# Patient Record
Sex: FEMALE | Race: WHITE | NOT HISPANIC OR LATINO | Employment: OTHER | URBAN - METROPOLITAN AREA
[De-identification: names, ages, dates, MRNs, and addresses within clinical notes are randomized per-mention and may not be internally consistent; named-entity substitution may affect disease eponyms.]

---

## 2023-06-14 ENCOUNTER — OFFICE VISIT (OUTPATIENT)
Dept: PRIMARY CARE | Facility: CLINIC | Age: 68
End: 2023-06-14
Payer: MEDICARE

## 2023-06-14 VITALS
DIASTOLIC BLOOD PRESSURE: 77 MMHG | BODY MASS INDEX: 29.52 KG/M2 | HEART RATE: 80 BPM | TEMPERATURE: 97.6 F | OXYGEN SATURATION: 98 % | WEIGHT: 172 LBS | SYSTOLIC BLOOD PRESSURE: 130 MMHG

## 2023-06-14 DIAGNOSIS — M25.552 BILATERAL HIP PAIN: Primary | ICD-10-CM

## 2023-06-14 DIAGNOSIS — R73.01 ELEVATED FASTING GLUCOSE: ICD-10-CM

## 2023-06-14 DIAGNOSIS — M25.551 BILATERAL HIP PAIN: Primary | ICD-10-CM

## 2023-06-14 DIAGNOSIS — R63.5 ABNORMAL WEIGHT GAIN: ICD-10-CM

## 2023-06-14 DIAGNOSIS — E04.1 THYROID NODULE: ICD-10-CM

## 2023-06-14 DIAGNOSIS — I10 ESSENTIAL HYPERTENSION, BENIGN: ICD-10-CM

## 2023-06-14 DIAGNOSIS — Z12.31 ENCOUNTER FOR SCREENING MAMMOGRAM FOR MALIGNANT NEOPLASM OF BREAST: ICD-10-CM

## 2023-06-14 DIAGNOSIS — E55.9 VITAMIN D INSUFFICIENCY: ICD-10-CM

## 2023-06-14 PROBLEM — E83.52: Status: ACTIVE | Noted: 2023-06-14

## 2023-06-14 PROBLEM — M47.812 CERVICAL SPONDYLOSIS WITHOUT MYELOPATHY: Status: ACTIVE | Noted: 2023-06-14

## 2023-06-14 PROBLEM — I51.9 DIASTOLIC DYSFUNCTION, LEFT VENTRICLE: Status: ACTIVE | Noted: 2023-06-14

## 2023-06-14 PROBLEM — R91.1 LUNG NODULE: Status: ACTIVE | Noted: 2023-06-14

## 2023-06-14 PROBLEM — R59.0 AXILLARY ADENOPATHY: Status: ACTIVE | Noted: 2023-06-14

## 2023-06-14 PROBLEM — N64.59 INVERTED NIPPLE: Status: ACTIVE | Noted: 2023-06-14

## 2023-06-14 PROBLEM — M85.9 DISORDER OF BONE DENSITY AND STRUCTURE, UNSPECIFIED: Status: ACTIVE | Noted: 2023-06-14

## 2023-06-14 PROCEDURE — 1036F TOBACCO NON-USER: CPT | Performed by: NURSE PRACTITIONER

## 2023-06-14 PROCEDURE — 99213 OFFICE O/P EST LOW 20 MIN: CPT | Performed by: NURSE PRACTITIONER

## 2023-06-14 PROCEDURE — 1159F MED LIST DOCD IN RCRD: CPT | Performed by: NURSE PRACTITIONER

## 2023-06-14 PROCEDURE — 3008F BODY MASS INDEX DOCD: CPT | Performed by: NURSE PRACTITIONER

## 2023-06-14 PROCEDURE — 3075F SYST BP GE 130 - 139MM HG: CPT | Performed by: NURSE PRACTITIONER

## 2023-06-14 PROCEDURE — 3078F DIAST BP <80 MM HG: CPT | Performed by: NURSE PRACTITIONER

## 2023-06-14 PROCEDURE — 1160F RVW MEDS BY RX/DR IN RCRD: CPT | Performed by: NURSE PRACTITIONER

## 2023-06-14 RX ORDER — LISINOPRIL AND HYDROCHLOROTHIAZIDE 20; 25 MG/1; MG/1
1 TABLET ORAL DAILY
COMMUNITY
End: 2023-10-26 | Stop reason: ALTCHOICE

## 2023-06-14 RX ORDER — AMLODIPINE BESYLATE 10 MG/1
10 TABLET ORAL DAILY
COMMUNITY
End: 2023-10-26 | Stop reason: SDUPTHER

## 2023-06-14 ASSESSMENT — PATIENT HEALTH QUESTIONNAIRE - PHQ9
SUM OF ALL RESPONSES TO PHQ9 QUESTIONS 1 AND 2: 0
2. FEELING DOWN, DEPRESSED OR HOPELESS: NOT AT ALL
1. LITTLE INTEREST OR PLEASURE IN DOING THINGS: NOT AT ALL

## 2023-06-14 NOTE — PROGRESS NOTES
Subjective   Patient ID: Ambreen Garcia is a 67 y.o. female who presents for Hip Pain (Bilateral hip pain, right hip is the worst/No injury that she is aware of).    Been 1-2 mo  worsening  Hip pain  R > L  After exercise worse  Right is more constant pain  Walks dogs  Golf  Never had this hip pain before  No hx XR  Tylenol for arthritis  - helps  No falls or trauma  No swelling or redness  No cracks or pops  No slippage  No numbness or tingling or burning        Review of Systems  ROS completely negative except what was mentioned in the HPI.  Problem List, surgical, social, and family histories which were reviewed and updated as necessary within the EMR. I also personally reviewed the notes, labs, and imaging that pertained to what was documented or discussed in the HPI.      Objective   Physical Exam  Vitals and nursing note reviewed.   Constitutional:       Appearance: Normal appearance.   HENT:      Head: Normocephalic and atraumatic.      Right Ear: External ear normal.      Left Ear: External ear normal.      Nose: Nose normal.      Mouth/Throat:      Mouth: Mucous membranes are moist.   Eyes:      Extraocular Movements: Extraocular movements intact.      Conjunctiva/sclera: Conjunctivae normal.      Pupils: Pupils are equal, round, and reactive to light.   Pulmonary:      Effort: Pulmonary effort is normal.   Musculoskeletal:         General: No swelling or tenderness. Normal range of motion.      Cervical back: Normal range of motion and neck supple.   Neurological:      General: No focal deficit present.      Mental Status: She is alert and oriented to person, place, and time. Mental status is at baseline.   Psychiatric:         Mood and Affect: Mood normal.         Behavior: Behavior normal.         Thought Content: Thought content normal.         Judgment: Judgment normal.         /77   Pulse 80   Temp 36.4 °C (97.6 °F) (Temporal)   Wt 78 kg (172 lb)   SpO2 98%   BMI 29.52 kg/m²      Assessment/Plan   Problem List Items Addressed This Visit       Bilateral hip pain - Primary     Desires Xrs  Declines medication or PT at this time         Relevant Orders    XR hips bilateral 5+ VW w or wo pelvis (Completed)    BMI 29.0-29.9,adult    Relevant Orders    Urinalysis with Reflex Microscopic    Lipid Panel    Vitamin D, Total    Comprehensive Metabolic Panel    CBC and Auto Differential    Hemoglobin A1C    Essential hypertension, benign    Relevant Orders    Urinalysis with Reflex Microscopic    Lipid Panel    Vitamin D, Total    Comprehensive Metabolic Panel    CBC and Auto Differential    Hemoglobin A1C    Thyroid nodule    Relevant Orders    Urinalysis with Reflex Microscopic    Lipid Panel    Vitamin D, Total    Comprehensive Metabolic Panel    CBC and Auto Differential    Hemoglobin A1C    Vitamin D insufficiency    Relevant Orders    Urinalysis with Reflex Microscopic    Lipid Panel    Vitamin D, Total    TSH with reflex to Free T4 if abnormal    Comprehensive Metabolic Panel    CBC and Auto Differential    Hemoglobin A1C     Other Visit Diagnoses       Abnormal weight gain        Relevant Orders    Urinalysis with Reflex Microscopic    Lipid Panel    Vitamin D, Total    TSH with reflex to Free T4 if abnormal    Comprehensive Metabolic Panel    CBC and Auto Differential    Hemoglobin A1C    Elevated fasting glucose        Relevant Orders    Urinalysis with Reflex Microscopic    Lipid Panel    Vitamin D, Total    TSH with reflex to Free T4 if abnormal    Comprehensive Metabolic Panel    CBC and Auto Differential    Hemoglobin A1C    Encounter for screening mammogram for malignant neoplasm of breast        Relevant Orders    BI mammo bilateral screening tomosynthesis

## 2023-06-15 PROBLEM — M76.891 ENTHESOPATHY OF RIGHT HIP REGION: Status: ACTIVE | Noted: 2023-06-15

## 2023-06-15 PROBLEM — Z00.00 ROUTINE HEALTH MAINTENANCE: Status: ACTIVE | Noted: 2023-06-15

## 2023-06-15 PROBLEM — K52.9 COLITIS: Status: ACTIVE | Noted: 2023-06-15

## 2023-06-15 PROBLEM — Z71.89 CARDIAC RISK COUNSELING: Status: ACTIVE | Noted: 2023-06-15

## 2023-06-15 PROBLEM — Z71.89 ADVANCE DIRECTIVE DISCUSSED WITH PATIENT: Status: ACTIVE | Noted: 2023-06-15

## 2023-09-26 ENCOUNTER — LAB (OUTPATIENT)
Dept: LAB | Facility: LAB | Age: 68
End: 2023-09-26
Payer: MEDICARE

## 2023-09-26 DIAGNOSIS — R63.5 ABNORMAL WEIGHT GAIN: ICD-10-CM

## 2023-09-26 DIAGNOSIS — R73.01 ELEVATED FASTING GLUCOSE: ICD-10-CM

## 2023-09-26 DIAGNOSIS — E04.1 THYROID NODULE: ICD-10-CM

## 2023-09-26 DIAGNOSIS — E55.9 VITAMIN D INSUFFICIENCY: ICD-10-CM

## 2023-09-26 DIAGNOSIS — I10 ESSENTIAL HYPERTENSION, BENIGN: ICD-10-CM

## 2023-09-26 LAB
ALANINE AMINOTRANSFERASE (SGPT) (U/L) IN SER/PLAS: 16 U/L (ref 7–45)
ALBUMIN (G/DL) IN SER/PLAS: 4.1 G/DL (ref 3.4–5)
ALKALINE PHOSPHATASE (U/L) IN SER/PLAS: 61 U/L (ref 33–136)
ANION GAP IN SER/PLAS: 10 MMOL/L (ref 10–20)
APPEARANCE, URINE: CLEAR
ASPARTATE AMINOTRANSFERASE (SGOT) (U/L) IN SER/PLAS: 16 U/L (ref 9–39)
BASOPHILS (10*3/UL) IN BLOOD BY AUTOMATED COUNT: 0.06 X10E9/L (ref 0–0.1)
BASOPHILS/100 LEUKOCYTES IN BLOOD BY AUTOMATED COUNT: 0.9 % (ref 0–2)
BILIRUBIN TOTAL (MG/DL) IN SER/PLAS: 0.5 MG/DL (ref 0–1.2)
BILIRUBIN, URINE: NEGATIVE
BLOOD, URINE: NEGATIVE
CALCIDIOL (25 OH VITAMIN D3) (NG/ML) IN SER/PLAS: 43 NG/ML
CALCIUM (MG/DL) IN SER/PLAS: 9.7 MG/DL (ref 8.6–10.3)
CARBON DIOXIDE, TOTAL (MMOL/L) IN SER/PLAS: 29 MMOL/L (ref 21–32)
CHLORIDE (MMOL/L) IN SER/PLAS: 105 MMOL/L (ref 98–107)
CHOLESTEROL (MG/DL) IN SER/PLAS: 211 MG/DL (ref 0–199)
CHOLESTEROL IN HDL (MG/DL) IN SER/PLAS: 76.9 MG/DL
CHOLESTEROL/HDL RATIO: 2.7
COLOR, URINE: ABNORMAL
CREATININE (MG/DL) IN SER/PLAS: 0.56 MG/DL (ref 0.5–1.05)
EOSINOPHILS (10*3/UL) IN BLOOD BY AUTOMATED COUNT: 0.16 X10E9/L (ref 0–0.7)
EOSINOPHILS/100 LEUKOCYTES IN BLOOD BY AUTOMATED COUNT: 2.4 % (ref 0–6)
ERYTHROCYTE DISTRIBUTION WIDTH (RATIO) BY AUTOMATED COUNT: 12.7 % (ref 11.5–14.5)
ERYTHROCYTE MEAN CORPUSCULAR HEMOGLOBIN CONCENTRATION (G/DL) BY AUTOMATED: 33.7 G/DL (ref 32–36)
ERYTHROCYTE MEAN CORPUSCULAR VOLUME (FL) BY AUTOMATED COUNT: 92 FL (ref 80–100)
ERYTHROCYTES (10*6/UL) IN BLOOD BY AUTOMATED COUNT: 4.19 X10E12/L (ref 4–5.2)
ESTIMATED AVERAGE GLUCOSE FOR HBA1C: 105 MG/DL
GFR FEMALE: >90 ML/MIN/1.73M2
GLUCOSE (MG/DL) IN SER/PLAS: 90 MG/DL (ref 74–99)
GLUCOSE, URINE: NEGATIVE MG/DL
HEMATOCRIT (%) IN BLOOD BY AUTOMATED COUNT: 38.6 % (ref 36–46)
HEMOGLOBIN (G/DL) IN BLOOD: 13 G/DL (ref 12–16)
HEMOGLOBIN A1C/HEMOGLOBIN TOTAL IN BLOOD: 5.3 %
IMMATURE GRANULOCYTES/100 LEUKOCYTES IN BLOOD BY AUTOMATED COUNT: 0.3 % (ref 0–0.9)
KETONES, URINE: NEGATIVE MG/DL
LDL: 115 MG/DL (ref 0–99)
LEUKOCYTE ESTERASE, URINE: ABNORMAL
LEUKOCYTES (10*3/UL) IN BLOOD BY AUTOMATED COUNT: 6.6 X10E9/L (ref 4.4–11.3)
LYMPHOCYTES (10*3/UL) IN BLOOD BY AUTOMATED COUNT: 2.58 X10E9/L (ref 1.2–4.8)
LYMPHOCYTES/100 LEUKOCYTES IN BLOOD BY AUTOMATED COUNT: 39.4 % (ref 13–44)
MONOCYTES (10*3/UL) IN BLOOD BY AUTOMATED COUNT: 0.72 X10E9/L (ref 0.1–1)
MONOCYTES/100 LEUKOCYTES IN BLOOD BY AUTOMATED COUNT: 11 % (ref 2–10)
MUCUS, URINE: NORMAL /LPF
NEUTROPHILS (10*3/UL) IN BLOOD BY AUTOMATED COUNT: 3.01 X10E9/L (ref 1.2–7.7)
NEUTROPHILS/100 LEUKOCYTES IN BLOOD BY AUTOMATED COUNT: 46 % (ref 40–80)
NITRITE, URINE: NEGATIVE
PH, URINE: 7 (ref 5–8)
PLATELETS (10*3/UL) IN BLOOD AUTOMATED COUNT: 320 X10E9/L (ref 150–450)
POTASSIUM (MMOL/L) IN SER/PLAS: 4.1 MMOL/L (ref 3.5–5.3)
PROTEIN TOTAL: 6.8 G/DL (ref 6.4–8.2)
PROTEIN, URINE: NEGATIVE MG/DL
RBC, URINE: <1 /HPF (ref 0–5)
SODIUM (MMOL/L) IN SER/PLAS: 140 MMOL/L (ref 136–145)
SPECIFIC GRAVITY, URINE: 1.01 (ref 1–1.03)
THYROTROPIN (MIU/L) IN SER/PLAS BY DETECTION LIMIT <= 0.05 MIU/L: 3.02 MIU/L (ref 0.44–3.98)
TRIGLYCERIDE (MG/DL) IN SER/PLAS: 98 MG/DL (ref 0–149)
UREA NITROGEN (MG/DL) IN SER/PLAS: 16 MG/DL (ref 6–23)
UROBILINOGEN, URINE: <2 MG/DL (ref 0–1.9)
VLDL: 20 MG/DL (ref 0–40)
WBC, URINE: 1 /HPF (ref 0–5)

## 2023-09-26 PROCEDURE — 80061 LIPID PANEL: CPT

## 2023-09-26 PROCEDURE — 80053 COMPREHEN METABOLIC PANEL: CPT

## 2023-09-26 PROCEDURE — 36415 COLL VENOUS BLD VENIPUNCTURE: CPT

## 2023-09-26 PROCEDURE — 84443 ASSAY THYROID STIM HORMONE: CPT

## 2023-09-26 PROCEDURE — 85025 COMPLETE CBC W/AUTO DIFF WBC: CPT

## 2023-09-26 PROCEDURE — 83036 HEMOGLOBIN GLYCOSYLATED A1C: CPT

## 2023-09-26 PROCEDURE — 81001 URINALYSIS AUTO W/SCOPE: CPT

## 2023-09-26 PROCEDURE — 82306 VITAMIN D 25 HYDROXY: CPT

## 2023-10-09 ENCOUNTER — ANCILLARY PROCEDURE (OUTPATIENT)
Dept: RADIOLOGY | Facility: CLINIC | Age: 68
End: 2023-10-09
Payer: MEDICARE

## 2023-10-09 DIAGNOSIS — I10 ESSENTIAL HYPERTENSION, BENIGN: Primary | ICD-10-CM

## 2023-10-09 DIAGNOSIS — Z12.31 ENCOUNTER FOR SCREENING MAMMOGRAM FOR MALIGNANT NEOPLASM OF BREAST: ICD-10-CM

## 2023-10-09 PROCEDURE — 77067 SCR MAMMO BI INCL CAD: CPT

## 2023-10-09 PROCEDURE — 77067 SCR MAMMO BI INCL CAD: CPT | Mod: BILATERAL PROCEDURE | Performed by: RADIOLOGY

## 2023-10-09 PROCEDURE — 77063 BREAST TOMOSYNTHESIS BI: CPT | Mod: BILATERAL PROCEDURE | Performed by: RADIOLOGY

## 2023-10-09 RX ORDER — LISINOPRIL AND HYDROCHLOROTHIAZIDE 12.5; 2 MG/1; MG/1
2 TABLET ORAL DAILY
Qty: 180 TABLET | Refills: 0 | Status: SHIPPED | OUTPATIENT
Start: 2023-10-09 | End: 2023-10-26 | Stop reason: SDUPTHER

## 2023-10-26 ENCOUNTER — OFFICE VISIT (OUTPATIENT)
Dept: PRIMARY CARE | Facility: CLINIC | Age: 68
End: 2023-10-26
Payer: MEDICARE

## 2023-10-26 VITALS
DIASTOLIC BLOOD PRESSURE: 74 MMHG | HEART RATE: 77 BPM | TEMPERATURE: 98.2 F | BODY MASS INDEX: 27.32 KG/M2 | HEIGHT: 66 IN | WEIGHT: 170 LBS | OXYGEN SATURATION: 98 % | SYSTOLIC BLOOD PRESSURE: 112 MMHG

## 2023-10-26 DIAGNOSIS — M25.50 POLYARTHRALGIA: ICD-10-CM

## 2023-10-26 DIAGNOSIS — E55.9 VITAMIN D INSUFFICIENCY: ICD-10-CM

## 2023-10-26 DIAGNOSIS — Z00.00 ROUTINE HEALTH MAINTENANCE: Primary | ICD-10-CM

## 2023-10-26 DIAGNOSIS — Z87.19 HISTORY OF COLITIS: ICD-10-CM

## 2023-10-26 DIAGNOSIS — I10 ESSENTIAL HYPERTENSION, BENIGN: ICD-10-CM

## 2023-10-26 DIAGNOSIS — Z13.89 SCREENING FOR MULTIPLE CONDITIONS: ICD-10-CM

## 2023-10-26 DIAGNOSIS — L98.9 CHANGING SKIN LESION: ICD-10-CM

## 2023-10-26 DIAGNOSIS — Z12.31 ENCOUNTER FOR SCREENING MAMMOGRAM FOR BREAST CANCER: ICD-10-CM

## 2023-10-26 DIAGNOSIS — Z78.0 MENOPAUSE: ICD-10-CM

## 2023-10-26 DIAGNOSIS — E04.1 THYROID NODULE: ICD-10-CM

## 2023-10-26 DIAGNOSIS — Z71.89 CARDIAC RISK COUNSELING: ICD-10-CM

## 2023-10-26 DIAGNOSIS — Z71.89 ADVANCE DIRECTIVE DISCUSSED WITH PATIENT: ICD-10-CM

## 2023-10-26 PROBLEM — N64.59 INVERTED NIPPLE: Status: RESOLVED | Noted: 2023-06-14 | Resolved: 2023-10-26

## 2023-10-26 PROBLEM — R63.5 WEIGHT GAIN: Status: RESOLVED | Noted: 2023-06-14 | Resolved: 2023-10-26

## 2023-10-26 PROBLEM — R59.0 AXILLARY ADENOPATHY: Status: RESOLVED | Noted: 2023-06-14 | Resolved: 2023-10-26

## 2023-10-26 PROCEDURE — 3074F SYST BP LT 130 MM HG: CPT | Performed by: INTERNAL MEDICINE

## 2023-10-26 PROCEDURE — 1159F MED LIST DOCD IN RCRD: CPT | Performed by: INTERNAL MEDICINE

## 2023-10-26 PROCEDURE — 99214 OFFICE O/P EST MOD 30 MIN: CPT | Performed by: INTERNAL MEDICINE

## 2023-10-26 PROCEDURE — G0444 DEPRESSION SCREEN ANNUAL: HCPCS | Performed by: INTERNAL MEDICINE

## 2023-10-26 PROCEDURE — 3008F BODY MASS INDEX DOCD: CPT | Performed by: INTERNAL MEDICINE

## 2023-10-26 PROCEDURE — 3078F DIAST BP <80 MM HG: CPT | Performed by: INTERNAL MEDICINE

## 2023-10-26 PROCEDURE — 99497 ADVNCD CARE PLAN 30 MIN: CPT | Performed by: INTERNAL MEDICINE

## 2023-10-26 PROCEDURE — 1036F TOBACCO NON-USER: CPT | Performed by: INTERNAL MEDICINE

## 2023-10-26 PROCEDURE — G0439 PPPS, SUBSEQ VISIT: HCPCS | Performed by: INTERNAL MEDICINE

## 2023-10-26 PROCEDURE — 1160F RVW MEDS BY RX/DR IN RCRD: CPT | Performed by: INTERNAL MEDICINE

## 2023-10-26 PROCEDURE — G0446 INTENS BEHAVE THER CARDIO DX: HCPCS | Performed by: INTERNAL MEDICINE

## 2023-10-26 RX ORDER — BUTYROSPERMUM PARKII(SHEA BUTTER), SIMMONDSIA CHINENSIS (JOJOBA) SEED OIL, ALOE BARBADENSIS LEAF EXTRACT .01; 1; 3.5 G/100G; G/100G; G/100G
250 LIQUID TOPICAL DAILY
COMMUNITY

## 2023-10-26 RX ORDER — LISINOPRIL AND HYDROCHLOROTHIAZIDE 12.5; 2 MG/1; MG/1
2 TABLET ORAL DAILY
Qty: 180 TABLET | Refills: 3 | Status: SHIPPED | OUTPATIENT
Start: 2023-10-26 | End: 2024-01-10 | Stop reason: SDUPTHER

## 2023-10-26 RX ORDER — AMLODIPINE BESYLATE 10 MG/1
10 TABLET ORAL DAILY
Qty: 90 TABLET | Refills: 3 | Status: SHIPPED | OUTPATIENT
Start: 2023-10-26

## 2023-10-26 ASSESSMENT — PATIENT HEALTH QUESTIONNAIRE - PHQ9
SUM OF ALL RESPONSES TO PHQ9 QUESTIONS 1 AND 2: 0
1. LITTLE INTEREST OR PLEASURE IN DOING THINGS: NOT AT ALL
2. FEELING DOWN, DEPRESSED OR HOPELESS: NOT AT ALL

## 2023-10-26 NOTE — ASSESSMENT & PLAN NOTE
Annual Wellness exam completed   Preventive Health History reviewed  Ordered:   Labs    Mammogram   BM due 2024  Shingrix discussed

## 2023-10-26 NOTE — PROGRESS NOTES
Medicare Wellness Exam/Comprehensive Problem Focused Follow Up and Physical Exam  Chief Complaint   Patient presents with    Medicare Annual Wellness Visit Subsequent     Arthritis is getting worse  Discuss weight issues     HPI: Still gets hip pain  XR 6/23: Bilateral hip joints are intact and  appears grossly unremarkable. Joint spaces are preserved. There is  mild insertional enthesopathy at the right greater trochanter.  Bilateral sacroiliac joints are intact and demonstrate mild  degenerative changes  Also has pain in arms and legs. Worse if exercises  Hurts afterwards  Using Biofreeze  Stiff in AM for couple hours, or if sits for awhile also, also 1 hour  Uses tylenol arthritis    Has gained weight  Hasn't tried any diets    Labs reviewed:  Component      Latest Ref Rng 9/26/2023   WBC      4.4 - 11.3 x10E9/L 6.6    RBC      4.00 - 5.20 x10E12/L 4.19    HEMOGLOBIN      12.0 - 16.0 g/dL 13.0    HEMATOCRIT      36.0 - 46.0 % 38.6    MCV      80 - 100 fL 92    MCHC      32.0 - 36.0 g/dL 33.7    Platelets      150 - 450 x10E9/L 320    RED CELL DISTRIBUTION WIDTH      11.5 - 14.5 % 12.7    Neutrophils %      40.0 - 80.0 % 46.0    Immature Granulocytes %, Automated      0.0 - 0.9 % 0.3    Lymphocytes %      13.0 - 44.0 % 39.4    Monocytes %      2.0 - 10.0 % 11.0    Eosinophils %      0.0 - 6.0 % 2.4    Basophils %      0.0 - 2.0 % 0.9    Neutrophils Absolute      1.20 - 7.70 x10E9/L 3.01    Lymphocytes Absolute      1.20 - 4.80 x10E9/L 2.58    Monocytes Absolute      0.10 - 1.00 x10E9/L 0.72    Eosinophils Absolute      0.00 - 0.70 x10E9/L 0.16    Basophils Absolute      0.00 - 0.10 x10E9/L 0.06    GLUCOSE      74 - 99 mg/dL 90    SODIUM      136 - 145 mmol/L 140    POTASSIUM      3.5 - 5.3 mmol/L 4.1    CHLORIDE      98 - 107 mmol/L 105    Bicarbonate      21 - 32 mmol/L 29    Anion Gap      10 - 20 mmol/L 10    Blood Urea Nitrogen      6 - 23 mg/dL 16    Creatinine      0.50 - 1.05 mg/dL 0.56    GFR Female       >90 mL/min/1.73m2 >90    Calcium      8.6 - 10.3 mg/dL 9.7    Albumin      3.4 - 5.0 g/dL 4.1    Alkaline Phosphatase      33 - 136 U/L 61    Total Protein      6.4 - 8.2 g/dL 6.8    AST      9 - 39 U/L 16    Bilirubin Total      0.0 - 1.2 mg/dL 0.5    ALT      7 - 45 U/L 16    Color, Urine      STRAW,YELLOW  STRAW    Appearance, Urine      CLEAR  CLEAR    Specific Gravity, Urine      1.005 - 1.035  1.009    pH, Urine      5.0 - 8.0  7.0    Protein, Urine      NEGATIVE mg/dL NEGATIVE    Glucose, Urine      NEGATIVE mg/dL NEGATIVE    Blood, Urine      NEGATIVE  NEGATIVE    Ketones, Urine      NEGATIVE mg/dL NEGATIVE    Bilirubin, Urine      NEGATIVE  NEGATIVE    Urobilinogen, Urine      0.0 - 1.9 mg/dL <2.0    Nitrite, Urine      NEGATIVE  NEGATIVE    Leukocyte Esterase, Urine      NEGATIVE  TRACE !    CHOLESTEROL      0 - 199 mg/dL 211 (H)    HDL CHOLESTEROL      mg/dL 76.9    Cholesterol/HDL Ratio 2.7    LDL Calculated      0 - 99 mg/dL 115 (H)    VLDL      0 - 40 mg/dL 20    TRIGLYCERIDES      0 - 149 mg/dL 98    WBC, Urine      0 - 5 /HPF 1    RBC, Urine      0 - 5 /HPF <1    Mucus, Urine      /LPF 1+    Hemoglobin A1C      % 5.3    Estimated Average Glucose      MG/    Vitamin D, 25-Hydroxy, Total      ng/mL 43    Thyroid Stimulating Hormone      0.44 - 3.98 mIU/L 3.02       Assessment and Plan:  Problem List Items Addressed This Visit          High    Routine health maintenance - Primary    Overview         Declined Prevnar 13, Prevnar 20 and Influenza vaccines      Pneumovax 23 8/10/20      Moderna COVID 19 vaccine 2/27/21, 3/27/21       Tdap (Age 7+)12/2/2014      BMD 9/18, 8/20, 10/4/22      Mamm 9/11/18; 9/17/20; 9/23/21, 10/9/23      s/p TAHBSO      Cscope 3/24/21 (10yrs)      10/15/21: No evidence for abdominal aortic aneurysm.      9/15/22: Current 10-Year ASCVD Risk: 9.37% - Intermediate Risk         Current Assessment & Plan     Annual Wellness exam completed   Preventive Health History  reviewed  Ordered:   Labs    Mammogram   BM due 2024  Shingrix discussed              Medium    Advance directive discussed with patient    Overview     10/26/23:  HCPOA: Shelbie Hansen (friend)  Pt wishes to be cremated  DNR Arrest         BMI 27.0-27.9,adult    Overview     Will try IF  Also recommend WW         Relevant Orders    Cortisol    Cardiac risk counseling    Overview     10/25/23: Current 10-Year ASCVD Risk: 9.3% - Intermediate Risk   ASA not indicated         Encounter for screening mammogram for breast cancer    Relevant Orders    BI mammo bilateral screening tomosynthesis    Essential hypertension, benign    Overview     Goal <130/80      2/21/22: hers: 145/93 hr 99              ours: 134/82 hr 94      On ACE/HCTZ and Norvasc      Controlled at home         Relevant Medications    lisinopriL-hydrochlorothiazide 20-12.5 mg tablet    amLODIPine (Norvasc) 10 mg tablet    Other Relevant Orders    Urinalysis with Reflex Microscopic    Comprehensive Metabolic Panel    CBC and Auto Differential    Lipid Panel    Albumin, urine, random    History of colitis    Overview     Bx--> LC      s/p Entocort, resolved      On Probiotic      Monitor for recurrence         Menopause    Relevant Orders    XR DEXA bone density    Polyarthralgia    Overview     Sx suggestive of CTD/AI cause  Cannot use NSAIDs due to hx colitis  Continue Tylenol         Current Assessment & Plan     Check for AI causes  Rheum consult         Relevant Orders    TSH with reflex to Free T4 if abnormal    JOCELIN with Reflex to JEREL    Rheumatoid factor    Sedimentation Rate    C-reactive protein    Citrulline Antibody, IgG    Referral to Rheumatology    Screening for multiple conditions    Overview     Depression screening completed using the PHQ-2 questions with results documented in the chart/encounter  (See Rooming Screenings for documentation)           Thyroid nodule    Overview     US 9/21: Stable multinodular thyroid gland dating back to April  2019. Ultrasound      confirms a 16mm nodule when previously measured 18 mm with other subcentimeter      nonsuspicious appearing nodules present.       Per ENT consult 10/21:Rcommendation for the patient is observation and repeat in      1 year.       2018:thyroid echo impression: 1.8cm right thyroid nodule, suspicious. FNA bx under US      guidance is rec'd for further eval      FNA done in 2019      Saw ENT      US 8/2020: Nonspecific diffuse heterogeneity of the thyroid parenchyma         Relevant Orders    TSH with reflex to Free T4 if abnormal    Vitamin D insufficiency    Overview     Goal 40-50      On supplement         Relevant Orders    Vitamin D 25-Hydroxy,Total (for eval of Vitamin D levels)     Other Visit Diagnoses       Changing skin lesion        Relevant Orders    Referral to Dermatology              Active Problem List  Patient Active Problem List   Diagnosis    Actinic keratosis    Cervical spondylosis without myelopathy    Diastolic dysfunction, left ventricle    Disorder of bone density and structure, unspecified    Endometriosis    Essential hypertension, benign    Familial hypocalciuric hypercalcemia syndrome    Lung nodule    Thyroid nodule    Vitamin D insufficiency    BMI 27.0-27.9,adult    Polyarthralgia    Enthesopathy of right hip region    Advance directive discussed with patient    Cardiac risk counseling    History of colitis    Routine health maintenance    Menopause    Encounter for screening mammogram for breast cancer    Screening for multiple conditions       Comprehensive Medical/Surgical/Social/Family History  Past Medical History:   Diagnosis Date    Abnormal PET scan, lung 10/2021    10/21: 1. Mild metabolic activity within the left lower lobe pulmonary nodule which     demonstrates stable to decreased activity when compared to the prior exam. This favors     a non malignant etiology, possibly due to a resolving infectious/inflammatory process.     Recommend continued  follow-up imaging to assess for stability/resolution.     2. There is a hypermetabolic focus within the m    Abnormal ultrasound of thyroid gland 09/2021 9/21: Stable multinodular thyroid gland :      8/20: Nonspecific diffuse heterogeneity of the thyroid parenchyma and multiple nodules     within both lobes, not significantly changed from prior. No new dominant thyroid     nodule.     Right thyroid lobe there is a heterogeneously hypoechoic predominantly solid ovoid     nodule measuring 1.8 x 0.9 x 1.5 cm, not significantly changed in size    H/O bone density study     09/18- Tscore -1.9 FRAX 8.7/1.1 OSTEOPENIA     8/20: Ten Year Major Osteoporotic Fracture Risk: 9.09%      Ten Year Hip Fracture Risk: 1.02%      T-Score: -1.5    H/O bone density study     10/22: Lowest T score -1.4. FRAX: Major Osteoporotic Fracture 9.4%   Hip Fracture                1.2%    H/O chest x-ray     9/19/18 NORMAL    H/O CT scan     8/20: CT calcium score=0     11 x 10 mm nodule in the left lower lobe. Further workup     with dedicated CT thorax is recommended    H/O CT scan of chest     9/20: 1. 1.96 cm oval lesion in the left lower lobe. Neoplasm cannot be     excluded. Further evaluation with PET-CT is suggested. The referring     physician was notified via critical results.     2. Emphysematous changes.     8/22: 8 mm noncalcified pulmonary nodule left lung base stable. 5 mm nodule     left lung base stable. 6 mm nodule right lung base stable. Benign     calcified granuloma l    H/O Doppler ultrasound     10/15/21: US aorta: No evidence for abdominal aortic aneurysm.    H/O echocardiogram 06/2022 6/22: 1. The left ventricular systolic function is normal with a 55-60% estimated ejection     fraction.     2. Spectral Doppler shows an impaired relaxation pattern of left ventricular diastolic     filling.     3. There is no evidence of left ventricular hypertrophy     4. Trace MR/TR    H/O x-ray of hip 06/14/2023    Bilateral hips  - 1. No acute osseous findings. 2. Mild insertional enthesopathy at the level of right greater trochanter. Recommend correlation with patient's symptomatology associated with this. An MRI can be obtained for further evaluation if clinically warranted.    History of MRI of cervical spine 2018: impression : cervical spondylosis most significantly affecting c5-c6 and c6-c7.     There is mild flattening of the cord    History of MRI of chest 2018: C6-7 upper limits of normal to mildly enlarged. hemangiomas, nodul in R lobe of thyroid gland c/ scattered cervical lymph nodes measuring 12mm. questionable     enlargement vs upper limits of normal size exiting nerve roots C7-T1 &     C6-7: possibility polyneuropathy or less likely nerve sheath tumors. degenerative     changes cervical spine (rec MRI C spine). intermediate nodule 1 cm in     Past Surgical History:   Procedure Laterality Date    COLONOSCOPY  2021    normal    HERNIA REPAIR  09/15/2018    Hernia Repair    HYSTERECTOMY      one ovary remains    INCISIONAL BREAST BIOPSY  09/15/2018    Incisional Breast Biopsy     Social History     Social History Narrative    Family History:        M: HTN, Breast CA age 65, Hypercalcemia, arthritis ( age 90)        F: Lymphoma ( age 82)        MGM: HTN, Breast CA        B:        B: HTN        S: HTN        Social History:    Single, no kids    Retired from [employer] in [place], [employer] worked since COVID    Works Part time at [employer]    Nonsmoker    Social ETOH     Tobacco/Alcohol/Opioid use, as well as Illicit Drug Use was screened for/reviewed and documented in Social Documentation section of the chart and medication list as appropriate    Allergies and Medications  Amoxicillin and Meperidine  Current Outpatient Medications   Medication Instructions    amLODIPine (NORVASC) 10 mg, oral, Daily    lisinopriL-hydrochlorothiazide 20-12.5 mg tablet 2 tablets, oral, Daily    saccharomyces  boulardii (FLORASTOR) 250 mg, oral, Daily     Medications and Supplements  prescribed by me and other practitioners or clinical pharmacist (such as prescriptions, OTC's, herbal therapies and supplements) were reviewed and documented in the medical record.      Activities of Daily Living  In your present state of health, do you have any difficulty performing the following activities?:   Preparing food and eating?: No  Bathing yourself: No  Getting dressed: No  Using the toilet:No  Moving around from place to place: No  In the past year have you fallen or had a near fall?:No  Able to manage finances independently: Yes  Able to perform grocery shopping: Yes  Able to manage medications independently: Yes  Able to do housework independently: Yes  Patient self-assessment of health status? Good    Patient Care Team:  Leisa Scales MD as PCP - General  Leisa Scales MD as PCP - [facility]       Current exercise habits: walking 3 dogs daily   Dietary issues discussed: Yes  Hearing difficulties: No  Safe in current home environment: Yes  Visual Acuity assessed: No  Cognitive Impairment No    Depression Screening  Depression screening (15m) completed using the PHQ-2 questions with results documented in the chart/encounter  (Rooming Screening section for documentation, or note for additional information)    Cardiac Risk Assessment  Cardiovascular risk was discussed and, if needed, lifestyle modifications recommended, including nutritional choices, exercise, and elimination of habits contributing to risk.   We agreed on a plan to reduce the current cardiovascular risk based on above discussion as needed.     Aspirin use/disuse was discussed and documented in the Problem List of the medical record (under Cardiac Risk Counseling) after reviewing the updated guidelines below:  Consider low dose Aspirin ( mg) use if the benefit for cardiovascular disease prevention outweighs risk for bleeding complications.  "  In general, low dose ASA should be considered:  In patients WITHOUT prior MI/stroke/PAD (primary prevention):   a. Age <60: Use if 10-year cardiovascular disease risk >20%, with discussion of risks and benefits with patient  b. Age 60-<70: Use if 10-year cardiovascular disease risk >20% and low bleeding (e.g., gastrointestinal) risk, with discussion of risks and benefits with patient  c. Age >=70: Do not use    In patients WITH prior MI/stroke/PAD (secondary prevention):   Generally use unless extremely high bleeding (e.g., gastrointenstinal) risk, with discussion of risks and benefits with patient    Advance Directives Discussion  Advanced Care Planning (including a Living Will, Healthcare POA, as well as specific end of life choices and/or directives), was discussed with the patient and/or surrogate, voluntarily, and details of that discussion documented in the Problem List (under Advanced Directives Discussion) of the medical record.    (~16 min spent discussing above)     ROS otherwise negative aside from what was mentioned above in HPI.    Vitals  /74   Pulse 77   Temp 36.8 °C (98.2 °F)   Ht 1.664 m (5' 5.5\")   Wt 77.1 kg (170 lb)   SpO2 98%   BMI 27.86 kg/m²   Body mass index is 27.86 kg/m².  Physical Exam  Gen: Alert, NAD  HEENT:  Unremarkable  Neck:  No MARY  Respiratory:  Lungs CTAB  Cardiovascular:  Heart RRR  Neuro:  Gross motor and sensory intact  Skin:  No suspicious lesions present. Likely SK on left upper arm  Breast: No masses, or axillary lymphadenopathy  BacK: SIJ tender  Ext: + TB tender to palpation    During the course of the visit the patient was educated and counseled about age appropriate screening and preventive services. Completed preventive screenings were documented in the chart and orders were placed for outstanding screenings/procedures as documented in the Assessment and Plan.    Patient Instructions (the written plan) was given to the patient at check out.  "

## 2023-10-27 ENCOUNTER — LAB (OUTPATIENT)
Dept: LAB | Facility: LAB | Age: 68
End: 2023-10-27
Payer: MEDICARE

## 2023-10-27 DIAGNOSIS — R79.89 HIGH SERUM CORTISOL: Primary | ICD-10-CM

## 2023-10-27 DIAGNOSIS — M25.50 POLYARTHRALGIA: ICD-10-CM

## 2023-10-27 LAB
CCP IGG SERPL-ACNC: <1 U/ML
CORTIS SERPL-MCNC: 25.2 UG/DL (ref 2.5–20)
CRP SERPL-MCNC: 0.75 MG/DL
ERYTHROCYTE [SEDIMENTATION RATE] IN BLOOD BY WESTERGREN METHOD: 10 MM/H (ref 0–30)
RHEUMATOID FACT SER NEPH-ACNC: <10 IU/ML (ref 0–15)

## 2023-10-27 PROCEDURE — 82533 TOTAL CORTISOL: CPT

## 2023-10-27 PROCEDURE — 85652 RBC SED RATE AUTOMATED: CPT

## 2023-10-27 PROCEDURE — 86140 C-REACTIVE PROTEIN: CPT

## 2023-10-27 PROCEDURE — 86431 RHEUMATOID FACTOR QUANT: CPT

## 2023-10-27 PROCEDURE — 86038 ANTINUCLEAR ANTIBODIES: CPT

## 2023-10-27 PROCEDURE — 86200 CCP ANTIBODY: CPT

## 2023-10-27 PROCEDURE — 36415 COLL VENOUS BLD VENIPUNCTURE: CPT

## 2023-10-27 RX ORDER — DEXAMETHASONE 1 MG/1
1 TABLET ORAL ONCE
Qty: 1 TABLET | Refills: 0 | Status: SHIPPED | OUTPATIENT
Start: 2023-10-27 | End: 2023-10-27

## 2023-10-30 ENCOUNTER — LAB (OUTPATIENT)
Dept: LAB | Facility: LAB | Age: 68
End: 2023-10-30
Payer: MEDICARE

## 2023-10-30 DIAGNOSIS — R79.89 HIGH SERUM CORTISOL: ICD-10-CM

## 2023-10-30 LAB
ANA SER QL HEP2 SUBST: NEGATIVE
CORTIS SERPL-MCNC: 1.2 UG/DL (ref 2.5–20)

## 2023-10-30 PROCEDURE — 36415 COLL VENOUS BLD VENIPUNCTURE: CPT

## 2023-10-30 PROCEDURE — 80375 DRUG/SUBSTANCE NOS 1-3: CPT

## 2023-10-30 PROCEDURE — 82533 TOTAL CORTISOL: CPT

## 2023-11-01 ENCOUNTER — TELEPHONE (OUTPATIENT)
Dept: PRIMARY CARE | Facility: CLINIC | Age: 68
End: 2023-11-01
Payer: MEDICARE

## 2023-11-01 NOTE — TELEPHONE ENCOUNTER
Appropriately suppressed after dexamethasone  Normal adrenal function   Patient calling after she reviewed above result   Wants to know why her cortisol would be high to begin with  Is there something she needs to do differently   States can send her back response via [employer](I can copy your response there)

## 2023-11-01 NOTE — TELEPHONE ENCOUNTER
Ususlly is stress induced, or lack of quality sleep that causes elevated cortisol Is the stress hormone)  Endo consult would be next step  Format referral and schedule

## 2023-11-04 LAB — DEXAMETHASONE SERPL-MCNC: 264.9 NG/DL

## 2024-01-10 DIAGNOSIS — I10 ESSENTIAL HYPERTENSION, BENIGN: ICD-10-CM

## 2024-01-10 RX ORDER — LISINOPRIL AND HYDROCHLOROTHIAZIDE 12.5; 2 MG/1; MG/1
2 TABLET ORAL DAILY
Qty: 180 TABLET | Refills: 3 | Status: SHIPPED | OUTPATIENT
Start: 2024-01-10

## 2024-05-03 ENCOUNTER — TELEPHONE (OUTPATIENT)
Dept: PRIMARY CARE | Facility: CLINIC | Age: 69
End: 2024-05-03
Payer: MEDICARE

## 2024-05-03 DIAGNOSIS — M25.552 LEFT HIP PAIN: ICD-10-CM

## 2024-05-03 NOTE — TELEPHONE ENCOUNTER
Patient calling states she has  left hip pain x 2 weeks  Rec'd her to go to    Verbally understands VERÓNICA

## 2024-05-17 ENCOUNTER — OFFICE VISIT (OUTPATIENT)
Dept: ORTHOPEDIC SURGERY | Facility: CLINIC | Age: 69
End: 2024-05-17
Payer: MEDICARE

## 2024-05-17 ENCOUNTER — HOSPITAL ENCOUNTER (OUTPATIENT)
Dept: RADIOLOGY | Facility: CLINIC | Age: 69
Discharge: HOME | End: 2024-05-17
Payer: MEDICARE

## 2024-05-17 DIAGNOSIS — M25.552 HIP PAIN, LEFT: ICD-10-CM

## 2024-05-17 PROCEDURE — 2500000004 HC RX 250 GENERAL PHARMACY W/ HCPCS (ALT 636 FOR OP/ED): Performed by: STUDENT IN AN ORGANIZED HEALTH CARE EDUCATION/TRAINING PROGRAM

## 2024-05-17 PROCEDURE — 1036F TOBACCO NON-USER: CPT | Performed by: STUDENT IN AN ORGANIZED HEALTH CARE EDUCATION/TRAINING PROGRAM

## 2024-05-17 PROCEDURE — 1159F MED LIST DOCD IN RCRD: CPT | Performed by: STUDENT IN AN ORGANIZED HEALTH CARE EDUCATION/TRAINING PROGRAM

## 2024-05-17 PROCEDURE — 2500000005 HC RX 250 GENERAL PHARMACY W/O HCPCS: Performed by: STUDENT IN AN ORGANIZED HEALTH CARE EDUCATION/TRAINING PROGRAM

## 2024-05-17 PROCEDURE — 99214 OFFICE O/P EST MOD 30 MIN: CPT | Mod: 25 | Performed by: STUDENT IN AN ORGANIZED HEALTH CARE EDUCATION/TRAINING PROGRAM

## 2024-05-17 PROCEDURE — 3008F BODY MASS INDEX DOCD: CPT | Performed by: STUDENT IN AN ORGANIZED HEALTH CARE EDUCATION/TRAINING PROGRAM

## 2024-05-17 PROCEDURE — 20610 DRAIN/INJ JOINT/BURSA W/O US: CPT | Mod: LT | Performed by: STUDENT IN AN ORGANIZED HEALTH CARE EDUCATION/TRAINING PROGRAM

## 2024-05-17 PROCEDURE — 73502 X-RAY EXAM HIP UNI 2-3 VIEWS: CPT | Mod: LEFT SIDE | Performed by: STUDENT IN AN ORGANIZED HEALTH CARE EDUCATION/TRAINING PROGRAM

## 2024-05-17 PROCEDURE — 99204 OFFICE O/P NEW MOD 45 MIN: CPT | Performed by: STUDENT IN AN ORGANIZED HEALTH CARE EDUCATION/TRAINING PROGRAM

## 2024-05-17 PROCEDURE — 73502 X-RAY EXAM HIP UNI 2-3 VIEWS: CPT | Mod: LT

## 2024-05-17 RX ORDER — TRIAMCINOLONE ACETONIDE 40 MG/ML
40 INJECTION, SUSPENSION INTRA-ARTICULAR; INTRAMUSCULAR
Status: COMPLETED | OUTPATIENT
Start: 2024-05-17 | End: 2024-05-17

## 2024-05-17 RX ORDER — LIDOCAINE HYDROCHLORIDE 10 MG/ML
4 INJECTION INFILTRATION; PERINEURAL
Status: COMPLETED | OUTPATIENT
Start: 2024-05-17 | End: 2024-05-17

## 2024-05-17 RX ADMIN — LIDOCAINE HYDROCHLORIDE 4 ML: 10 INJECTION, SOLUTION INFILTRATION; PERINEURAL at 17:28

## 2024-05-17 RX ADMIN — TRIAMCINOLONE ACETONIDE 40 MG: 40 INJECTION, SUSPENSION INTRA-ARTICULAR; INTRAMUSCULAR at 17:28

## 2024-05-17 NOTE — PROGRESS NOTES
History of Present Illness:   Patient presents today for evaluation of left hip pain.  Describes the pain is predominately in the lateral aspect of the hip endorses pain with specific activities but predominately with direct pressure.  The patient denies any significant groin pain, the patient denies any significant low back pain or any numbness or tingling.  Patient describes the pain as moderate in nature achy, sharp with direct pressure lying on that side.    Patient enjoys playing golf has been having some lateral based hip pain to go with that type of activity.  Some days better than others.    Past Medical History:   Diagnosis Date    Abnormal PET scan, lung 10/2021    10/21: 1. Mild metabolic activity within the left lower lobe pulmonary nodule which     demonstrates stable to decreased activity when compared to the prior exam. This favors     a non malignant etiology, possibly due to a resolving infectious/inflammatory process.     Recommend continued follow-up imaging to assess for stability/resolution.     2. There is a hypermetabolic focus within the m    Abnormal ultrasound of thyroid gland 09/2021 9/21: Stable multinodular thyroid gland :      8/20: Nonspecific diffuse heterogeneity of the thyroid parenchyma and multiple nodules     within both lobes, not significantly changed from prior. No new dominant thyroid     nodule.     Right thyroid lobe there is a heterogeneously hypoechoic predominantly solid ovoid     nodule measuring 1.8 x 0.9 x 1.5 cm, not significantly changed in size    H/O bone density study     09/18- Tscore -1.9 FRAX 8.7/1.1 OSTEOPENIA     8/20: Ten Year Major Osteoporotic Fracture Risk: 9.09%      Ten Year Hip Fracture Risk: 1.02%      T-Score: -1.5    H/O bone density study     10/22: Lowest T score -1.4. FRAX: Major Osteoporotic Fracture 9.4%   Hip Fracture                1.2%    H/O chest x-ray     9/19/18 NORMAL    H/O CT scan     8/20: CT calcium score=0     11 x 10 mm  nodule in the left lower lobe. Further workup     with dedicated CT thorax is recommended    H/O CT scan of chest     9/20: 1. 1.96 cm oval lesion in the left lower lobe. Neoplasm cannot be     excluded. Further evaluation with PET-CT is suggested. The referring     physician was notified via critical results.     2. Emphysematous changes.     8/22: 8 mm noncalcified pulmonary nodule left lung base stable. 5 mm nodule     left lung base stable. 6 mm nodule right lung base stable. Benign     calcified granuloma l    H/O Doppler ultrasound     10/15/21: US aorta: No evidence for abdominal aortic aneurysm.    H/O echocardiogram 06/2022 6/22: 1. The left ventricular systolic function is normal with a 55-60% estimated ejection     fraction.     2. Spectral Doppler shows an impaired relaxation pattern of left ventricular diastolic     filling.     3. There is no evidence of left ventricular hypertrophy     4. Trace MR/TR    H/O x-ray of hip 06/14/2023    Bilateral hips - 1. No acute osseous findings. 2. Mild insertional enthesopathy at the level of right greater trochanter. Recommend correlation with patient's symptomatology associated with this. An MRI can be obtained for further evaluation if clinically warranted.    History of MRI of cervical spine 2018    2018: impression : cervical spondylosis most significantly affecting c5-c6 and c6-c7.     There is mild flattening of the cord    History of MRI of chest 09/2018 9/2018: C6-7 upper limits of normal to mildly enlarged. hemangiomas, nodul in R lobe of thyroid gland c/ scattered cervical lymph nodes measuring 12mm. questionable     enlargement vs upper limits of normal size exiting nerve roots C7-T1 &     C6-7: possibility polyneuropathy or less likely nerve sheath tumors. degenerative     changes cervical spine (rec MRI C spine). intermediate nodule 1 cm in     Past Surgical History:   Procedure Laterality Date    COLONOSCOPY  03/2021    normal    HERNIA REPAIR   09/15/2018    Hernia Repair    HYSTERECTOMY      one ovary remains    INCISIONAL BREAST BIOPSY  09/15/2018    Incisional Breast Biopsy       Current Outpatient Medications:     amLODIPine (Norvasc) 10 mg tablet, Take 1 tablet (10 mg) by mouth once daily., Disp: 90 tablet, Rfl: 3    dexAMETHasone (Decadron) 1 mg tablet, Take 1 tablet (1 mg) by mouth 1 time for 1 dose. Take at 11pm the night before the cortisol lab test, Disp: 1 tablet, Rfl: 0    lisinopriL-hydrochlorothiazide 20-12.5 mg tablet, Take 2 tablets by mouth once daily., Disp: 180 tablet, Rfl: 3    saccharomyces boulardii (Florastor) 250 mg capsule, Take 1 capsule (250 mg) by mouth once daily., Disp: , Rfl:     Review of Systems   GENERAL: Negative  GI: Negative  MUSCULOSKELETAL: See HPI  SKIN: Negative  NEURO:  Negative    Physical Examination:  Left Hip:  Normal gait  Mild Trendelenburg sign  Skin healthy and intact  No tenderness to palpation over lumbar spine  Positive tenderness over greater trochanter    Full forward flexion  Symmetric motion, no loss of internal rotation   No weakness with resisted hip flexion, abduction or adduction    Negative flexion/adduction/internal rotation  Negative flexion/abduction/external rotation test  Negative straight leg raise  Neurovascular exam normal distally    Imaging:  XR hip left with pelvis when performed 2 or 3 views    Result Date: 5/17/2024  Interpreted By:  Josef Michael III, STUDY: XR HIP LEFT WITH PELVIS WHEN PERFORMED 2 OR 3 VIEWS; ;  5/17/2024 2:39 pm   INDICATION: Signs/Symptoms:pain.   COMPARISON: None.   ACCESSION NUMBER(S): [id number]   ORDERING CLINICIAN: JOSEF MICHAEL   FINDINGS: Bilateral hip x-rays: No acute osseous abnormality no fracture or dislocation appreciated no lytic or blastic lesions appreciated mild joint space narrowing about bilateral hip articulations.       No acute osseous abnormality     MACRO: None   Signed by: Josef Michael III 5/17/2024 2:58 PM Dictation workstation:    [id number]   L Inj/Asp: L greater trochanteric bursa on 5/17/2024 5:28 PM  Indications: pain  Details: 22 G needle, lateral approach  Medications: 40 mg triamcinolone acetonide 40 mg/mL; 4 mL lidocaine 10 mg/mL (1 %)  Consent was given by the patient. Immediately prior to procedure a time out was called to verify the correct patient, procedure, equipment, support staff and site/side marked as required. Patient was prepped and draped in the usual sterile fashion.             Assessment:   Patient with left hip trochanteric bursitis     Plan:  Discussed trochanteric bursitis with the patient.  We reviewed a variety of treatment options including physical therapy for stretching.  We discussed topical modalities as well as oral anti-inflammatories risk benefits and contraindications.  We discussed the role for corticosteroid injections.  We reviewed the possibility of recurrence as well as concomitant abductor tendon tear.  Patient wished to proceed with a left hip trochanteric bursa injection today  Discussed that this injection is diagnostic and therapeutic we will see how she does with this.  If fails to improve may benefit from evaluation of x-rays of her lumbar spine

## 2024-07-22 ENCOUNTER — APPOINTMENT (OUTPATIENT)
Dept: ORTHOPEDIC SURGERY | Facility: CLINIC | Age: 69
End: 2024-07-22
Payer: MEDICARE

## 2024-07-22 ENCOUNTER — OFFICE VISIT (OUTPATIENT)
Dept: ORTHOPEDIC SURGERY | Facility: CLINIC | Age: 69
End: 2024-07-22
Payer: MEDICARE

## 2024-07-22 ENCOUNTER — HOSPITAL ENCOUNTER (OUTPATIENT)
Dept: RADIOLOGY | Facility: CLINIC | Age: 69
Discharge: HOME | End: 2024-07-22
Payer: MEDICARE

## 2024-07-22 DIAGNOSIS — M25.562 ACUTE PAIN OF LEFT KNEE: ICD-10-CM

## 2024-07-22 DIAGNOSIS — M79.89 LEFT LEG SWELLING: Primary | ICD-10-CM

## 2024-07-22 DIAGNOSIS — M79.89 LEFT LEG SWELLING: ICD-10-CM

## 2024-07-22 PROCEDURE — 93971 EXTREMITY STUDY: CPT

## 2024-07-22 PROCEDURE — 99204 OFFICE O/P NEW MOD 45 MIN: CPT | Performed by: INTERNAL MEDICINE

## 2024-07-22 PROCEDURE — 73562 X-RAY EXAM OF KNEE 3: CPT | Mod: LEFT SIDE | Performed by: INTERNAL MEDICINE

## 2024-07-22 PROCEDURE — 99213 OFFICE O/P EST LOW 20 MIN: CPT | Mod: 25 | Performed by: INTERNAL MEDICINE

## 2024-07-22 PROCEDURE — 93971 EXTREMITY STUDY: CPT | Performed by: RADIOLOGY

## 2024-07-22 PROCEDURE — 73562 X-RAY EXAM OF KNEE 3: CPT | Mod: LT

## 2024-07-22 RX ORDER — PREDNISONE 50 MG/1
50 TABLET ORAL DAILY
Qty: 5 TABLET | Refills: 0 | Status: SHIPPED | OUTPATIENT
Start: 2024-07-22 | End: 2024-07-27

## 2024-07-22 RX ORDER — METHYLPREDNISOLONE 4 MG/1
TABLET ORAL
Qty: 1 EACH | Refills: 0 | Status: CANCELLED | OUTPATIENT
Start: 2024-07-22

## 2024-07-22 NOTE — PROGRESS NOTES
Acute Injury New Patient Visit    CC:   Chief Complaint   Patient presents with    Left Knee - Pain       HPI: Ambreen is a 68 y.o. female presents today for evaluation for acute left knee pain which started about a week ago. She denies any trauma or fall. She endorses posterior left knee pain and swelling, especially at the end of the day. She is here for initial evaluation and x-rays.        Review of Systems   GENERAL: Negative for malaise, significant weight loss, fever  MUSCULOSKELETAL: See HPI  NEURO:  Negative for numbness / tingling     Past Medical History  Past Medical History:   Diagnosis Date    Abnormal PET scan, lung 10/2021    10/21: 1. Mild metabolic activity within the left lower lobe pulmonary nodule which     demonstrates stable to decreased activity when compared to the prior exam. This favors     a non malignant etiology, possibly due to a resolving infectious/inflammatory process.     Recommend continued follow-up imaging to assess for stability/resolution.     2. There is a hypermetabolic focus within the m    Abnormal ultrasound of thyroid gland 09/2021 9/21: Stable multinodular thyroid gland :      8/20: Nonspecific diffuse heterogeneity of the thyroid parenchyma and multiple nodules     within both lobes, not significantly changed from prior. No new dominant thyroid     nodule.     Right thyroid lobe there is a heterogeneously hypoechoic predominantly solid ovoid     nodule measuring 1.8 x 0.9 x 1.5 cm, not significantly changed in size    H/O bone density study     09/18- Tscore -1.9 FRAX 8.7/1.1 OSTEOPENIA     8/20: Ten Year Major Osteoporotic Fracture Risk: 9.09%      Ten Year Hip Fracture Risk: 1.02%      T-Score: -1.5    H/O bone density study     10/22: Lowest T score -1.4. FRAX: Major Osteoporotic Fracture 9.4%   Hip Fracture                1.2%    H/O chest x-ray     9/19/18 NORMAL    H/O CT scan     8/20: CT calcium score=0     11 x 10 mm nodule in the left lower lobe. Further  workup     with dedicated CT thorax is recommended    H/O CT scan of chest     9/20: 1. 1.96 cm oval lesion in the left lower lobe. Neoplasm cannot be     excluded. Further evaluation with PET-CT is suggested. The referring     physician was notified via critical results.     2. Emphysematous changes.     8/22: 8 mm noncalcified pulmonary nodule left lung base stable. 5 mm nodule     left lung base stable. 6 mm nodule right lung base stable. Benign     calcified granuloma l    H/O Doppler ultrasound     10/15/21: US aorta: No evidence for abdominal aortic aneurysm.    H/O echocardiogram 06/2022 6/22: 1. The left ventricular systolic function is normal with a 55-60% estimated ejection     fraction.     2. Spectral Doppler shows an impaired relaxation pattern of left ventricular diastolic     filling.     3. There is no evidence of left ventricular hypertrophy     4. Trace MR/TR    H/O x-ray of hip 06/14/2023    Bilateral hips - 1. No acute osseous findings. 2. Mild insertional enthesopathy at the level of right greater trochanter. Recommend correlation with patient's symptomatology associated with this. An MRI can be obtained for further evaluation if clinically warranted.    History of MRI of cervical spine 2018 2018: impression : cervical spondylosis most significantly affecting c5-c6 and c6-c7.     There is mild flattening of the cord    History of MRI of chest 09/2018 9/2018: C6-7 upper limits of normal to mildly enlarged. hemangiomas, nodul in R lobe of thyroid gland c/ scattered cervical lymph nodes measuring 12mm. questionable     enlargement vs upper limits of normal size exiting nerve roots C7-T1 &     C6-7: possibility polyneuropathy or less likely nerve sheath tumors. degenerative     changes cervical spine (rec MRI C spine). intermediate nodule 1 cm in       Medication review  Medication Documentation Review Audit       Reviewed by Bruna Donnelly MA (Medical Assistant) on 05/17/24 at 1417       Medication Order Taking? Sig Documenting Provider Last Dose Status   amLODIPine (Norvasc) 10 mg tablet [id number]  Take 1 tablet (10 mg) by mouth once daily. Leisa Scales MD  Active   dexAMETHasone (Decadron) 1 mg tablet [id number]  Take 1 tablet (1 mg) by mouth 1 time for 1 dose. Take at 11pm the night before the cortisol lab test Leisa Scales MD   10/27/23 5651   lisinopriL-hydrochlorothiazide 20-12.5 mg tablet [id number]  Take 2 tablets by mouth once daily. Leisa Scales MD  Active   saccharomyces boulardii (Florastor) 250 mg capsule [id number]  Take 1 capsule (250 mg) by mouth once daily. Historical Provider, MD  Active                    Allergies  Allergies   Allergen Reactions    Amoxicillin Swelling    Meperidine Other       Social History  Social History     Socioeconomic History    Marital status:      Spouse name: Not on file    Number of children: Not on file    Years of education: Not on file    Highest education level: Not on file   Occupational History    Not on file   Tobacco Use    Smoking status: Never    Smokeless tobacco: Never   Substance and Sexual Activity    Alcohol use: Yes     Comment: socailly    Drug use: Never    Sexual activity: Not on file   Other Topics Concern    Not on file   Social History Narrative    Family History:        M: HTN, Breast CA age 65, Hypercalcemia, arthritis ( age 90)        F: Lymphoma ( age 82)        MGM: HTN, Breast CA        B:        B: HTN        S: HTN        Social History:    Single, no kids    Retired from [employer] in [place], hasnt worked since COVID    Works Part time at [employer]    Nonsmoker    Social ETOH     Social Determinants of Health     Financial Resource Strain: Not on file   Food Insecurity: Not on file   Transportation Needs: Not on file   Physical Activity: Not on file   Stress: Not on file   Social Connections: Not on file   Intimate Partner Violence: Not on file   Housing Stability: Not on file        Surgical History  Past Surgical History:   Procedure Laterality Date    COLONOSCOPY  03/2021    normal    HERNIA REPAIR  09/15/2018    Hernia Repair    HYSTERECTOMY      one ovary remains    INCISIONAL BREAST BIOPSY  09/15/2018    Incisional Breast Biopsy       Physical Exam:  GENERAL:  Patient is awake, alert, and oriented to person place and time.  Patient appears well nourished and well kept.  Affect Calm, Not Acutely Distressed.  HEENT:  Normocephalic, Atraumatic, EOMI  CARDIOVASCULAR:  Hemodynamically stable.  RESPIRATORY:  Normal respirations with unlabored breathing.  Extremity: Left knee examination shows skin is intact.  There is no erythema or warmth.  No effusion.  Can flex the right knee to 130 degrees.  Full extension at 0 degrees.  Trace to 1+ lower extremity pitting edema.  Pain over the gastroc muscle and popliteal fossa.  No pain over the medial joint line.  No pain over the lateral joint line.  There is no pain over the patellar or quadricep tendon.  No pain over the proximal tibia.  No pain over the popliteal fossa.  Negative valgus stress test.  Negative varus stress test.  Negative Harry's test medially with no instability.  Negative Harry's test laterally with no instability.  Negative Lachman's test.  Patellar and quadricep mechanism intact.  Negative anterior and posterior drawer test.  Negative patellar apprehension test.  Distal pulses are palpable, neurovascularly intact.  Walking with no significant antalgic gait.      Diagnostics: X-rays reviewed      Procedure: None    Assessment:   1.  Left leg swelling  2.  Left knee sprain    Plan: Ambreen presents today for initial evaluation for acute left knee pain which started a week ago. No trauma or fall.  She did a recent travel, x-rays showed no obvious fractures. We recommended stat Duplex ultrasound of the left leg to rule out DVT due to her leg swelling and recent travel, if negative we will place her on Medrol dose Zechariah acute  inflammation. She will follow-up next week for reevaluation.  Pending ultrasound results, may consider further imaging or possible intra-articular cortisone injection.    Orders Placed This Encounter    XR knee left 3 views      At the conclusion of the visit there were no further questions by the patient/family regarding their plan of care.  Patient was instructed to call or return with any issues, questions, or concerns regarding their injury and/or treatment plan described above.     07/22/24 at 10:03 AM - Devang Jaime MD  Scribe Attestation  By signing my name below, I, Frederick Sevillamo, Scribe   attest that this documentation has been prepared under the direction and in the presence of Devang Jaime MD.    Office: [phone]    This note was prepared using voice recognition software.  The details of this note are correct and have been reviewed, and corrected to the best of my ability.  Some grammatical errors may persist related to the Dragon software.

## 2024-07-23 ENCOUNTER — APPOINTMENT (OUTPATIENT)
Dept: RADIOLOGY | Facility: CLINIC | Age: 69
End: 2024-07-23
Payer: MEDICARE

## 2024-07-24 ENCOUNTER — APPOINTMENT (OUTPATIENT)
Dept: ORTHOPEDIC SURGERY | Facility: CLINIC | Age: 69
End: 2024-07-24
Payer: MEDICARE

## 2024-08-01 ENCOUNTER — APPOINTMENT (OUTPATIENT)
Dept: ORTHOPEDIC SURGERY | Facility: CLINIC | Age: 69
End: 2024-08-01
Payer: MEDICARE

## 2024-08-16 ENCOUNTER — HOSPITAL ENCOUNTER (OUTPATIENT)
Dept: RADIOLOGY | Facility: EXTERNAL LOCATION | Age: 69
Discharge: HOME | End: 2024-08-16

## 2024-08-16 ENCOUNTER — OFFICE VISIT (OUTPATIENT)
Dept: ORTHOPEDIC SURGERY | Facility: CLINIC | Age: 69
End: 2024-08-16
Payer: MEDICARE

## 2024-08-16 DIAGNOSIS — M25.562 ACUTE PAIN OF LEFT KNEE: ICD-10-CM

## 2024-08-16 DIAGNOSIS — M17.12 PRIMARY OSTEOARTHRITIS OF LEFT KNEE: Primary | ICD-10-CM

## 2024-08-16 PROCEDURE — 2500000005 HC RX 250 GENERAL PHARMACY W/O HCPCS: Performed by: INTERNAL MEDICINE

## 2024-08-16 PROCEDURE — 1036F TOBACCO NON-USER: CPT | Performed by: INTERNAL MEDICINE

## 2024-08-16 PROCEDURE — 99213 OFFICE O/P EST LOW 20 MIN: CPT | Performed by: INTERNAL MEDICINE

## 2024-08-16 PROCEDURE — 1159F MED LIST DOCD IN RCRD: CPT | Performed by: INTERNAL MEDICINE

## 2024-08-16 PROCEDURE — 20611 DRAIN/INJ JOINT/BURSA W/US: CPT | Mod: LT | Performed by: INTERNAL MEDICINE

## 2024-08-16 PROCEDURE — 2500000004 HC RX 250 GENERAL PHARMACY W/ HCPCS (ALT 636 FOR OP/ED): Performed by: INTERNAL MEDICINE

## 2024-08-16 RX ORDER — BETAMETHASONE SODIUM PHOSPHATE AND BETAMETHASONE ACETATE 3; 3 MG/ML; MG/ML
3 INJECTION, SUSPENSION INTRA-ARTICULAR; INTRALESIONAL; INTRAMUSCULAR; SOFT TISSUE
Status: COMPLETED | OUTPATIENT
Start: 2024-08-16 | End: 2024-08-16

## 2024-08-16 RX ORDER — LIDOCAINE HYDROCHLORIDE 10 MG/ML
6 INJECTION INFILTRATION; PERINEURAL
Status: COMPLETED | OUTPATIENT
Start: 2024-08-16 | End: 2024-08-16

## 2024-08-16 NOTE — PROGRESS NOTES
CC:   Chief Complaint   Patient presents with    Left Knee - Follow-up     Sprain  S/p stat US        HPI: Ambreen is a 68 y.o. female presents today for reevaluation for left knee pain and swelling, and is here for ultrasound follow-up. She states that she is doing better. She notes intermittent left knee swelling and posterior knee pain.         Review of Systems   GENERAL: Negative for malaise, significant weight loss, fever  MUSCULOSKELETAL: See HPI  NEURO:  Negative for numbness / tingling     Past Medical History  Past Medical History:   Diagnosis Date    Abnormal PET scan, lung 10/2021    10/21: 1. Mild metabolic activity within the left lower lobe pulmonary nodule which     demonstrates stable to decreased activity when compared to the prior exam. This favors     a non malignant etiology, possibly due to a resolving infectious/inflammatory process.     Recommend continued follow-up imaging to assess for stability/resolution.     2. There is a hypermetabolic focus within the m    Abnormal ultrasound of thyroid gland 09/2021 9/21: Stable multinodular thyroid gland :      8/20: Nonspecific diffuse heterogeneity of the thyroid parenchyma and multiple nodules     within both lobes, not significantly changed from prior. No new dominant thyroid     nodule.     Right thyroid lobe there is a heterogeneously hypoechoic predominantly solid ovoid     nodule measuring 1.8 x 0.9 x 1.5 cm, not significantly changed in size    H/O bone density study     09/18- Tscore -1.9 FRAX 8.7/1.1 OSTEOPENIA     8/20: Ten Year Major Osteoporotic Fracture Risk: 9.09%      Ten Year Hip Fracture Risk: 1.02%      T-Score: -1.5    H/O bone density study     10/22: Lowest T score -1.4. FRAX: Major Osteoporotic Fracture 9.4%   Hip Fracture                1.2%    H/O chest x-ray     9/19/18 NORMAL    H/O CT scan     8/20: CT calcium score=0     11 x 10 mm nodule in the left lower lobe. Further workup     with dedicated CT thorax is  recommended    H/O CT scan of chest     9/20: 1. 1.96 cm oval lesion in the left lower lobe. Neoplasm cannot be     excluded. Further evaluation with PET-CT is suggested. The referring     physician was notified via critical results.     2. Emphysematous changes.     8/22: 8 mm noncalcified pulmonary nodule left lung base stable. 5 mm nodule     left lung base stable. 6 mm nodule right lung base stable. Benign     calcified granuloma l    H/O Doppler ultrasound     10/15/21: US aorta: No evidence for abdominal aortic aneurysm.    H/O echocardiogram 06/2022 6/22: 1. The left ventricular systolic function is normal with a 55-60% estimated ejection     fraction.     2. Spectral Doppler shows an impaired relaxation pattern of left ventricular diastolic     filling.     3. There is no evidence of left ventricular hypertrophy     4. Trace MR/TR    H/O x-ray of hip 06/14/2023    Bilateral hips - 1. No acute osseous findings. 2. Mild insertional enthesopathy at the level of right greater trochanter. Recommend correlation with patient's symptomatology associated with this. An MRI can be obtained for further evaluation if clinically warranted.    History of MRI of cervical spine 2018    2018: impression : cervical spondylosis most significantly affecting c5-c6 and c6-c7.     There is mild flattening of the cord    History of MRI of chest 09/2018 9/2018: C6-7 upper limits of normal to mildly enlarged. hemangiomas, nodul in R lobe of thyroid gland c/ scattered cervical lymph nodes measuring 12mm. questionable     enlargement vs upper limits of normal size exiting nerve roots C7-T1 &     C6-7: possibility polyneuropathy or less likely nerve sheath tumors. degenerative     changes cervical spine (rec MRI C spine). intermediate nodule 1 cm in       Medication review  Medication Documentation Review Audit       Reviewed by Bruna Donnelly MA (Medical Assistant) on 08/16/24 at 0939      Medication Order Taking? Sig Documenting  Provider Last Dose Status   amLODIPine (Norvasc) 10 mg tablet [id number]  Take 1 tablet (10 mg) by mouth once daily. Leisa Scales MD  Active   dexAMETHasone (Decadron) 1 mg tablet [id number]  Take 1 tablet (1 mg) by mouth 1 time for 1 dose. Take at 11pm the night before the cortisol lab test Leisa Scales MD   10/27/23 2357   lisinopriL-hydrochlorothiazide 20-12.5 mg tablet [id number]  Take 2 tablets by mouth once daily. Leisa Scales MD  Active   saccharomyces boulardii (Florastor) 250 mg capsule [id number]  Take 1 capsule (250 mg) by mouth once daily. Historical Provider, MD  Active                    Allergies  Allergies   Allergen Reactions    Amoxicillin Swelling    Meperidine Other       Social History  Social History     Socioeconomic History    Marital status:      Spouse name: Not on file    Number of children: Not on file    Years of education: Not on file    Highest education level: Not on file   Occupational History    Not on file   Tobacco Use    Smoking status: Never    Smokeless tobacco: Never   Substance and Sexual Activity    Alcohol use: Yes     Comment: socailly    Drug use: Never    Sexual activity: Not on file   Other Topics Concern    Not on file   Social History Narrative    Family History:        M: HTN, Breast CA age 65, Hypercalcemia, arthritis ( age 90)        F: Lymphoma ( age 82)        MGM: HTN, Breast CA        B:        B: HTN        S: HTN        Social History:    Single, no kids    Retired from "[employer] in [place], hasnt worked since COVID    Works Part time at [employer]    Nonsmoker    Social ETOH     Social Determinants of Health     Financial Resource Strain: Not on file   Food Insecurity: Not on file   Transportation Needs: Not on file   Physical Activity: Not on file   Stress: Not on file   Social Connections: Not on file   Intimate Partner Violence: Not on file   Housing Stability: Not on file       Surgical History  Past Surgical  History:   Procedure Laterality Date    COLONOSCOPY  03/2021    normal    HERNIA REPAIR  09/15/2018    Hernia Repair    HYSTERECTOMY      one ovary remains    INCISIONAL BREAST BIOPSY  09/15/2018    Incisional Breast Biopsy       Physical Exam:  GENERAL:  Patient is awake, alert, and oriented to person place and time.  Patient appears well nourished and well kept.  Affect Calm, Not Acutely Distressed.  HEENT:  Normocephalic, Atraumatic, EOMI  CARDIOVASCULAR:  Hemodynamically stable.  RESPIRATORY:  Normal respirations with unlabored breathing.  Extremity: Left knee examination shows skin is intact. There is no erythema or warmth.  Trace to 1+ effusion. Can flex the right knee to 130 degrees with pain. Full extension at 0 degrees. Trace lower extremity pitting edema. Pain over the gastroc muscle and popliteal fossa.  Pain over the medial joint line.  Pain over the lateral joint line. There is no pain over the patellar or quadricep tendon. No pain over the proximal tibia. No pain over the popliteal fossa. Negative valgus stress test. Negative varus stress test. Negative Harry's test medially with no instability. Negative Harry's test laterally with no instability. Negative Lachman's test. Patellar and quadricep mechanism intact. Negative anterior and posterior drawer test. Negative patellar apprehension test. Distal pulses are palpable, neurovascularly intact. Walking with no significant antalgic gait.       Diagnostics: Ultrasound reviewed  XR knee left 3 views  Interpreted By:  Devang Rosas,   STUDY:  XR KNEE LEFT 3 VIEWS, 7/22/2024 9:58 am      INDICATION:  Signs/Symptoms:pain      ACCESSION NUMBER(S):  [id number]      ORDERING CLINICIAN:  DEVANG ROSAS      FINDINGS:  Left knee x-rays three views AP, lateral and sunrise view: No acute  fractures, no dislocation. Mild-to-moderate degenerative changes.          Signed by: Devang Rosas 7/22/2024 6:00 PM  Dictation workstation:   [id number]  Lower  extremity venous duplex left  Narrative: Interpreted By:  Collins Arevalo,   STUDY:  [facility] US LOWER EXTREMITY VENOUS DUPLEX LEFT; 7/22/2024 11:54 am      INDICATION:  Signs/Symptoms:pain.      COMPARISON:  None.      ACCESSION NUMBER(S):  [id number]      ORDERING CLINICIAN:  BOWEN ROSAS      TECHNIQUE:  Vascular ultrasound of the left lower extremity was performed. Real  time compression views as well as Gray scale, color Doppler and  spectral Doppler waveform analysis was performed.      FINDINGS:  Evaluation of the visualized portions of the left common femoral  vein, proximal, mid, and distal femoral vein, and popliteal vein were  performed.  Evaluation of the visualized portions of the posterior  tibial and peroneal veins were also performed.  In addition,  evaluation of the contralateral common femoral vein was performed.      Limitations: None      The evaluated veins demonstrate normal compressibility. There is  intact venous flow demonstrating normal respiratory variability and  normal augmentation of flow with calf compression. Therefore, there  is no ultrasonographic evidence for deep vein thrombosis within the  evaluated veins. No evidence of thrombus is seen within the  contralateral common femoral vein.      Impression: No sonographic evidence for deep vein thrombosis within the evaluated  veins of the left lower extremity.      MACRO:  None      Signed by: Collins Arevalo 7/22/2024 12:23 PM  Dictation workstation:   [id number]        Procedure: L Inj/Asp: L knee on 8/16/2024 10:11 AM  Indications: pain  Details: 22 G needle, ultrasound-guided lateral approach  Medications: 3 mL betamethasone acet,sod phos 6 mg/mL; 6 mL lidocaine 10 mg/mL (1 %)  Outcome: tolerated well, no immediate complications  Procedure, treatment alternatives, risks and benefits explained, specific risks discussed. Consent was given by the patient. Immediately prior to procedure a time out was called to verify the correct patient,  procedure, equipment, support staff and site/side marked as required. Patient was prepped and draped in the usual sterile fashion.             Assessment:   1.  Left knee pain  2.  Left knee osteoarthritis    Plan: Ambreen presents today for reevaluation for left knee pain and resolving left leg swelling, ultrasound showed no evidence of DVT. She is clinically doing better, denies any mechanical symptoms but has intermittent pain. We offered an ultrasound-guided cortisone injection to the left knee today, she was agreeable to the injection,. She will follow-up in 3-4 weeks for reevaluation.  If there is improvement but persistent pain, we discussed the possibility of future viscosupplement injections.  If there is no improvement may consider further imaging such as MRI.    No orders of the defined types were placed in this encounter.     At the conclusion of the visit there were no further questions by the patient/family regarding their plan of care.  Patient was instructed to call or return with any issues, questions, or concerns regarding their injury and/or treatment plan described above.     08/16/24 at 9:42 AM - Devang Jaime MD  Scribe Attestation  By signing my name below, I, Frederick Reynosoamie, Scribe   attest that this documentation has been prepared under the direction and in the presence of Devang Jaime MD.    Office: [phone]    This note was prepared using voice recognition software.  The details of this note are correct and have been reviewed, and corrected to the best of my ability.  Some grammatical errors may persist related to the Dragon software.

## 2024-09-06 ENCOUNTER — APPOINTMENT (OUTPATIENT)
Dept: ORTHOPEDIC SURGERY | Facility: CLINIC | Age: 69
End: 2024-09-06
Payer: MEDICARE

## 2024-10-16 ENCOUNTER — HOSPITAL ENCOUNTER (OUTPATIENT)
Dept: RADIOLOGY | Facility: CLINIC | Age: 69
Discharge: HOME | End: 2024-10-16
Payer: MEDICARE

## 2024-10-16 DIAGNOSIS — Z78.0 MENOPAUSE: ICD-10-CM

## 2024-10-16 PROCEDURE — 77080 DXA BONE DENSITY AXIAL: CPT | Performed by: RADIOLOGY

## 2024-10-16 PROCEDURE — 77080 DXA BONE DENSITY AXIAL: CPT

## 2024-10-30 ENCOUNTER — APPOINTMENT (OUTPATIENT)
Dept: LAB | Facility: LAB | Age: 69
End: 2024-10-30
Payer: MEDICARE

## 2024-10-30 ENCOUNTER — HOSPITAL ENCOUNTER (OUTPATIENT)
Dept: RADIOLOGY | Facility: CLINIC | Age: 69
Discharge: HOME | End: 2024-10-30
Payer: MEDICARE

## 2024-10-30 VITALS — HEIGHT: 66 IN | WEIGHT: 170 LBS | BODY MASS INDEX: 27.32 KG/M2

## 2024-10-30 DIAGNOSIS — E55.9 VITAMIN D INSUFFICIENCY: Primary | ICD-10-CM

## 2024-10-30 DIAGNOSIS — Z12.31 ENCOUNTER FOR SCREENING MAMMOGRAM FOR BREAST CANCER: ICD-10-CM

## 2024-10-30 DIAGNOSIS — E04.1 THYROID NODULE: ICD-10-CM

## 2024-10-30 DIAGNOSIS — I10 ESSENTIAL HYPERTENSION, BENIGN: ICD-10-CM

## 2024-10-30 PROCEDURE — 77063 BREAST TOMOSYNTHESIS BI: CPT

## 2024-10-30 PROCEDURE — 77067 SCR MAMMO BI INCL CAD: CPT | Performed by: RADIOLOGY

## 2024-10-30 PROCEDURE — 77063 BREAST TOMOSYNTHESIS BI: CPT | Performed by: RADIOLOGY

## 2024-11-13 ENCOUNTER — LAB (OUTPATIENT)
Dept: LAB | Facility: LAB | Age: 69
End: 2024-11-13
Payer: MEDICARE

## 2024-11-13 DIAGNOSIS — E04.1 THYROID NODULE: ICD-10-CM

## 2024-11-13 DIAGNOSIS — E55.9 VITAMIN D INSUFFICIENCY: ICD-10-CM

## 2024-11-13 DIAGNOSIS — I10 ESSENTIAL HYPERTENSION, BENIGN: ICD-10-CM

## 2024-11-13 LAB
25(OH)D3 SERPL-MCNC: 41 NG/ML (ref 30–100)
ALBUMIN SERPL BCP-MCNC: 4 G/DL (ref 3.4–5)
ALP SERPL-CCNC: 57 U/L (ref 33–136)
ALT SERPL W P-5'-P-CCNC: 15 U/L (ref 7–45)
ANION GAP SERPL CALC-SCNC: 10 MMOL/L (ref 10–20)
APPEARANCE UR: CLEAR
AST SERPL W P-5'-P-CCNC: 13 U/L (ref 9–39)
BASOPHILS # BLD AUTO: 0.05 X10*3/UL (ref 0–0.1)
BASOPHILS NFR BLD AUTO: 0.8 %
BILIRUB SERPL-MCNC: 0.4 MG/DL (ref 0–1.2)
BILIRUB UR STRIP.AUTO-MCNC: NEGATIVE MG/DL
BUN SERPL-MCNC: 18 MG/DL (ref 6–23)
CALCIUM SERPL-MCNC: 9.8 MG/DL (ref 8.6–10.3)
CHLORIDE SERPL-SCNC: 104 MMOL/L (ref 98–107)
CHOLEST SERPL-MCNC: 221 MG/DL (ref 0–199)
CHOLESTEROL/HDL RATIO: 2.7
CO2 SERPL-SCNC: 31 MMOL/L (ref 21–32)
COLOR UR: ABNORMAL
CREAT SERPL-MCNC: 0.54 MG/DL (ref 0.5–1.05)
CREAT UR-MCNC: 106 MG/DL (ref 20–320)
EGFRCR SERPLBLD CKD-EPI 2021: >90 ML/MIN/1.73M*2
EOSINOPHIL # BLD AUTO: 0.16 X10*3/UL (ref 0–0.4)
EOSINOPHIL NFR BLD AUTO: 2.6 %
ERYTHROCYTE [DISTWIDTH] IN BLOOD BY AUTOMATED COUNT: 12.5 % (ref 11.5–14.5)
GLUCOSE SERPL-MCNC: 93 MG/DL (ref 74–99)
GLUCOSE UR STRIP.AUTO-MCNC: NORMAL MG/DL
HCT VFR BLD AUTO: 37.9 % (ref 36–46)
HDLC SERPL-MCNC: 80.4 MG/DL
HGB BLD-MCNC: 12.8 G/DL (ref 12–16)
IMM GRANULOCYTES # BLD AUTO: 0.01 X10*3/UL (ref 0–0.5)
IMM GRANULOCYTES NFR BLD AUTO: 0.2 % (ref 0–0.9)
KETONES UR STRIP.AUTO-MCNC: NEGATIVE MG/DL
LDLC SERPL CALC-MCNC: 124 MG/DL
LEUKOCYTE ESTERASE UR QL STRIP.AUTO: ABNORMAL
LYMPHOCYTES # BLD AUTO: 2.88 X10*3/UL (ref 0.8–3)
LYMPHOCYTES NFR BLD AUTO: 47.7 %
MCH RBC QN AUTO: 30.8 PG (ref 26–34)
MCHC RBC AUTO-ENTMCNC: 33.8 G/DL (ref 32–36)
MCV RBC AUTO: 91 FL (ref 80–100)
MICROALBUMIN UR-MCNC: 19.3 MG/L
MICROALBUMIN/CREAT UR: 18.2 UG/MG CREAT
MONOCYTES # BLD AUTO: 0.67 X10*3/UL (ref 0.05–0.8)
MONOCYTES NFR BLD AUTO: 11.1 %
MUCOUS THREADS #/AREA URNS AUTO: NORMAL /LPF
NEUTROPHILS # BLD AUTO: 2.27 X10*3/UL (ref 1.6–5.5)
NEUTROPHILS NFR BLD AUTO: 37.6 %
NITRITE UR QL STRIP.AUTO: NEGATIVE
NON HDL CHOLESTEROL: 141 MG/DL (ref 0–149)
NRBC BLD-RTO: 0 /100 WBCS (ref 0–0)
PH UR STRIP.AUTO: 6.5 [PH]
PLATELET # BLD AUTO: 304 X10*3/UL (ref 150–450)
POTASSIUM SERPL-SCNC: 4.1 MMOL/L (ref 3.5–5.3)
PROT SERPL-MCNC: 6.6 G/DL (ref 6.4–8.2)
PROT UR STRIP.AUTO-MCNC: NEGATIVE MG/DL
RBC # BLD AUTO: 4.16 X10*6/UL (ref 4–5.2)
RBC # UR STRIP.AUTO: NEGATIVE /UL
RBC #/AREA URNS AUTO: NORMAL /HPF
SODIUM SERPL-SCNC: 141 MMOL/L (ref 136–145)
SP GR UR STRIP.AUTO: 1.02
TRIGL SERPL-MCNC: 83 MG/DL (ref 0–149)
TSH SERPL-ACNC: 2.28 MIU/L (ref 0.44–3.98)
UROBILINOGEN UR STRIP.AUTO-MCNC: NORMAL MG/DL
VIT B12 SERPL-MCNC: 376 PG/ML (ref 211–911)
VLDL: 17 MG/DL (ref 0–40)
WBC # BLD AUTO: 6 X10*3/UL (ref 4.4–11.3)
WBC #/AREA URNS AUTO: NORMAL /HPF

## 2024-11-13 PROCEDURE — 82570 ASSAY OF URINE CREATININE: CPT

## 2024-11-13 PROCEDURE — 84443 ASSAY THYROID STIM HORMONE: CPT

## 2024-11-13 PROCEDURE — 82306 VITAMIN D 25 HYDROXY: CPT

## 2024-11-13 PROCEDURE — 82607 VITAMIN B-12: CPT

## 2024-11-13 PROCEDURE — 80053 COMPREHEN METABOLIC PANEL: CPT

## 2024-11-13 PROCEDURE — 80061 LIPID PANEL: CPT

## 2024-11-13 PROCEDURE — 36415 COLL VENOUS BLD VENIPUNCTURE: CPT

## 2024-11-13 PROCEDURE — 85025 COMPLETE CBC W/AUTO DIFF WBC: CPT

## 2024-11-13 PROCEDURE — 82043 UR ALBUMIN QUANTITATIVE: CPT

## 2024-11-13 PROCEDURE — 81001 URINALYSIS AUTO W/SCOPE: CPT

## 2024-12-03 ENCOUNTER — APPOINTMENT (OUTPATIENT)
Dept: PRIMARY CARE | Facility: CLINIC | Age: 69
End: 2024-12-03
Payer: MEDICARE

## 2024-12-03 VITALS
BODY MASS INDEX: 28.73 KG/M2 | WEIGHT: 168.25 LBS | DIASTOLIC BLOOD PRESSURE: 76 MMHG | HEART RATE: 79 BPM | HEIGHT: 64 IN | SYSTOLIC BLOOD PRESSURE: 118 MMHG | TEMPERATURE: 97.7 F | OXYGEN SATURATION: 99 %

## 2024-12-03 DIAGNOSIS — Z13.6 SCREENING FOR CARDIOVASCULAR CONDITION: ICD-10-CM

## 2024-12-03 DIAGNOSIS — Z71.89 CARDIAC RISK COUNSELING: ICD-10-CM

## 2024-12-03 DIAGNOSIS — M47.812 CERVICAL SPONDYLOSIS WITHOUT MYELOPATHY: ICD-10-CM

## 2024-12-03 DIAGNOSIS — Z13.89 SCREENING FOR MULTIPLE CONDITIONS: ICD-10-CM

## 2024-12-03 DIAGNOSIS — Z13.39 ALCOHOL SCREENING: ICD-10-CM

## 2024-12-03 DIAGNOSIS — E55.9 VITAMIN D INSUFFICIENCY: ICD-10-CM

## 2024-12-03 DIAGNOSIS — I10 ESSENTIAL HYPERTENSION, BENIGN: ICD-10-CM

## 2024-12-03 DIAGNOSIS — M79.18 MYALGIA, MULTIPLE SITES: ICD-10-CM

## 2024-12-03 DIAGNOSIS — E04.1 THYROID NODULE: ICD-10-CM

## 2024-12-03 DIAGNOSIS — Z12.31 ENCOUNTER FOR SCREENING MAMMOGRAM FOR BREAST CANCER: ICD-10-CM

## 2024-12-03 DIAGNOSIS — Z00.00 ROUTINE HEALTH MAINTENANCE: Primary | ICD-10-CM

## 2024-12-03 DIAGNOSIS — L70.9 ACNE, UNSPECIFIED ACNE TYPE: ICD-10-CM

## 2024-12-03 DIAGNOSIS — Z71.89 ADVANCE DIRECTIVE DISCUSSED WITH PATIENT: ICD-10-CM

## 2024-12-03 DIAGNOSIS — Z23 NEED FOR TDAP VACCINATION: ICD-10-CM

## 2024-12-03 PROBLEM — M76.891 ENTHESOPATHY OF RIGHT HIP REGION: Status: RESOLVED | Noted: 2023-06-15 | Resolved: 2024-12-03

## 2024-12-03 PROBLEM — Z13.9 ENCOUNTER FOR SCREENING INVOLVING SOCIAL DETERMINANTS OF HEALTH (SDOH): Status: ACTIVE | Noted: 2024-12-03

## 2024-12-03 PROCEDURE — 1160F RVW MEDS BY RX/DR IN RCRD: CPT | Performed by: INTERNAL MEDICINE

## 2024-12-03 PROCEDURE — 99497 ADVNCD CARE PLAN 30 MIN: CPT | Performed by: INTERNAL MEDICINE

## 2024-12-03 PROCEDURE — 1123F ACP DISCUSS/DSCN MKR DOCD: CPT | Performed by: INTERNAL MEDICINE

## 2024-12-03 PROCEDURE — 1159F MED LIST DOCD IN RCRD: CPT | Performed by: INTERNAL MEDICINE

## 2024-12-03 PROCEDURE — G0442 ANNUAL ALCOHOL SCREEN 15 MIN: HCPCS | Performed by: INTERNAL MEDICINE

## 2024-12-03 PROCEDURE — 1036F TOBACCO NON-USER: CPT | Performed by: INTERNAL MEDICINE

## 2024-12-03 PROCEDURE — 3078F DIAST BP <80 MM HG: CPT | Performed by: INTERNAL MEDICINE

## 2024-12-03 PROCEDURE — G0439 PPPS, SUBSEQ VISIT: HCPCS | Performed by: INTERNAL MEDICINE

## 2024-12-03 PROCEDURE — G0446 INTENS BEHAVE THER CARDIO DX: HCPCS | Performed by: INTERNAL MEDICINE

## 2024-12-03 PROCEDURE — 3074F SYST BP LT 130 MM HG: CPT | Performed by: INTERNAL MEDICINE

## 2024-12-03 PROCEDURE — 99214 OFFICE O/P EST MOD 30 MIN: CPT | Performed by: INTERNAL MEDICINE

## 2024-12-03 PROCEDURE — 3008F BODY MASS INDEX DOCD: CPT | Performed by: INTERNAL MEDICINE

## 2024-12-03 RX ORDER — CLINDAMYCIN PHOSPHATE 10 UG/ML
1 LOTION TOPICAL 2 TIMES DAILY
Qty: 60 ML | Refills: 3 | Status: SHIPPED | OUTPATIENT
Start: 2024-12-03

## 2024-12-03 RX ORDER — PREDNISONE 20 MG/1
40 TABLET ORAL DAILY
Qty: 10 TABLET | Refills: 0 | Status: SHIPPED | OUTPATIENT
Start: 2024-12-03 | End: 2024-12-08

## 2024-12-03 RX ORDER — ACETAMINOPHEN 500 MG
2000 TABLET ORAL DAILY
Start: 2024-12-03

## 2024-12-03 RX ORDER — CYANOCOBALAMIN 1000 UG/ML
1000 INJECTION, SOLUTION INTRAMUSCULAR; SUBCUTANEOUS ONCE
Status: SHIPPED | OUTPATIENT
Start: 2024-12-03

## 2024-12-03 RX ORDER — DOXYCYCLINE HYCLATE 50 MG/1
50 CAPSULE, GELATIN COATED ORAL
Qty: 30 CAPSULE | Refills: 3 | Status: SHIPPED | OUTPATIENT
Start: 2024-12-03

## 2024-12-03 RX ORDER — AMLODIPINE BESYLATE 10 MG/1
10 TABLET ORAL DAILY
Qty: 90 TABLET | Refills: 3 | Status: SHIPPED | OUTPATIENT
Start: 2024-12-03

## 2024-12-03 RX ORDER — CLINDAMYCIN PHOSPHATE 10 UG/ML
1 LOTION TOPICAL 2 TIMES DAILY
COMMUNITY
End: 2024-12-03 | Stop reason: SDUPTHER

## 2024-12-03 RX ORDER — LISINOPRIL AND HYDROCHLOROTHIAZIDE 12.5; 2 MG/1; MG/1
2 TABLET ORAL DAILY
Qty: 180 TABLET | Refills: 3 | Status: SHIPPED | OUTPATIENT
Start: 2024-12-03

## 2024-12-03 RX ORDER — METRONIDAZOLE 7.5 MG/G
1 CREAM TOPICAL 2 TIMES DAILY
Qty: 45 G | Refills: 3 | Status: SHIPPED | OUTPATIENT
Start: 2024-12-03

## 2024-12-03 RX ORDER — DOXYCYCLINE HYCLATE 50 MG/1
1 CAPSULE, GELATIN COATED ORAL
COMMUNITY
Start: 2024-08-07 | End: 2024-12-03 | Stop reason: SDUPTHER

## 2024-12-03 RX ORDER — METRONIDAZOLE 7.5 MG/G
1 CREAM TOPICAL 2 TIMES DAILY
COMMUNITY
Start: 2024-07-30 | End: 2024-12-03 | Stop reason: SDUPTHER

## 2024-12-03 SDOH — ECONOMIC STABILITY: INCOME INSECURITY: IN THE LAST 12 MONTHS, WAS THERE A TIME WHEN YOU WERE NOT ABLE TO PAY THE MORTGAGE OR RENT ON TIME?: NO

## 2024-12-03 SDOH — ECONOMIC STABILITY: FOOD INSECURITY: WITHIN THE PAST 12 MONTHS, YOU WORRIED THAT YOUR FOOD WOULD RUN OUT BEFORE YOU GOT MONEY TO BUY MORE.: NEVER TRUE

## 2024-12-03 SDOH — ECONOMIC STABILITY: FOOD INSECURITY: WITHIN THE PAST 12 MONTHS, THE FOOD YOU BOUGHT JUST DIDN'T LAST AND YOU DIDN'T HAVE MONEY TO GET MORE.: NEVER TRUE

## 2024-12-03 SDOH — ECONOMIC STABILITY: TRANSPORTATION INSECURITY
IN THE PAST 12 MONTHS, HAS LACK OF TRANSPORTATION KEPT YOU FROM MEETINGS, WORK, OR FROM GETTING THINGS NEEDED FOR DAILY LIVING?: NO

## 2024-12-03 SDOH — ECONOMIC STABILITY: TRANSPORTATION INSECURITY
IN THE PAST 12 MONTHS, HAS THE LACK OF TRANSPORTATION KEPT YOU FROM MEDICAL APPOINTMENTS OR FROM GETTING MEDICATIONS?: NO

## 2024-12-03 ASSESSMENT — SOCIAL DETERMINANTS OF HEALTH (SDOH): IN THE PAST 12 MONTHS, HAS THE ELECTRIC, GAS, OIL, OR WATER COMPANY THREATENED TO SHUT OFF SERVICE IN YOUR HOME?: NO

## 2024-12-03 NOTE — ASSESSMENT & PLAN NOTE
15 minutes spent discussing:  BMI was above normal measurement. Current weight: 76.3 kg (168 lb 4 oz)  Weight change since last visit (-) denotes wt loss -1.75 lbs   Weight loss needed to achieve BMI 25: 21.75 Lbs  Weight loss needed to achieve BMI 30: -7.55 Lbs  Will try semaglutide compounded  I have discussed both the risks and benefits of the treatment plan, including potential adverse effects/benefits of medication as well as not taking medication (and the potential risks of that), and patient understands/agrees with the treatment plan.

## 2024-12-03 NOTE — ASSESSMENT & PLAN NOTE
Annual Wellness exam completed   Preventive Health History reviewed  Ordered:   Labs    Mammogram   TDAP at pharmacy  Discussed Shingrix

## 2024-12-03 NOTE — PROGRESS NOTES
Annual Medicare Wellness Exam/Comprehensive Problem Focused Follow Up  HPI/CC  Chief Complaint   Patient presents with    Medicare Annual Wellness Visit Subsequent   Never saw Rheum  Saw Ortho   Had shots in hip/knee, better  Still has total body stiffness in AM, better as day progresses  Works at  so hang/lifts clothes all day  Sore  Exercises in AM and better afterwards  Takes couple hours  If sits again has symptoms    Has neck pain base of neck    Tried IF and overate when ate    Labs reviewed:  Component      Latest Ref Rng 11/13/2024   WBC      4.4 - 11.3 x10*3/uL 6.0    nRBC      0.0 - 0.0 /100 WBCs 0.0    RBC      4.00 - 5.20 x10*6/uL 4.16    HEMOGLOBIN      12.0 - 16.0 g/dL 12.8    HEMATOCRIT      36.0 - 46.0 % 37.9    MCV      80 - 100 fL 91    MCH      26.0 - 34.0 pg 30.8    MCHC      32.0 - 36.0 g/dL 33.8    RED CELL DISTRIBUTION WIDTH      11.5 - 14.5 % 12.5    Platelets      150 - 450 x10*3/uL 304    Neutrophils %      40.0 - 80.0 % 37.6    Immature Granulocytes %, Automated      0.0 - 0.9 % 0.2    Lymphocytes %      13.0 - 44.0 % 47.7    Monocytes %      2.0 - 10.0 % 11.1    Eosinophils %      0.0 - 6.0 % 2.6    Basophils %      0.0 - 2.0 % 0.8    Neutrophils Absolute      1.60 - 5.50 x10*3/uL 2.27    Immature Granulocytes Absolute, Automated      0.00 - 0.50 x10*3/uL 0.01    Lymphocytes Absolute      0.80 - 3.00 x10*3/uL 2.88    Monocytes Absolute      0.05 - 0.80 x10*3/uL 0.67    Eosinophils Absolute      0.00 - 0.40 x10*3/uL 0.16    Basophils Absolute      0.00 - 0.10 x10*3/uL 0.05    GLUCOSE      74 - 99 mg/dL 93    SODIUM      136 - 145 mmol/L 141    POTASSIUM      3.5 - 5.3 mmol/L 4.1    CHLORIDE      98 - 107 mmol/L 104    Bicarbonate      21 - 32 mmol/L 31    Anion Gap      10 - 20 mmol/L 10    Blood Urea Nitrogen      6 - 23 mg/dL 18    Creatinine      0.50 - 1.05 mg/dL 0.54    EGFR      >60 mL/min/1.73m*2 >90    Calcium      8.6 - 10.3 mg/dL 9.8    Albumin      3.4 - 5.0 g/dL 4.0     Alkaline Phosphatase      33 - 136 U/L 57    Total Protein      6.4 - 8.2 g/dL 6.6    AST      9 - 39 U/L 13    Bilirubin Total      0.0 - 1.2 mg/dL 0.4    ALT      7 - 45 U/L 15    Color, Urine      Light-Yellow, Yellow, Dark-Yellow  Light-Yellow    Appearance, Urine      Clear  Clear    Specific Gravity, Urine      1.005 - 1.035  1.019    pH, Urine      5.0, 5.5, 6.0, 6.5, 7.0, 7.5, 8.0  6.5    Protein, Urine      NEGATIVE, 10 (TRACE), 20 (TRACE) mg/dL NEGATIVE    Glucose, Urine      Normal mg/dL Normal    Blood, Urine      NEGATIVE  NEGATIVE    Ketones, Urine      NEGATIVE mg/dL NEGATIVE    Bilirubin, Urine      NEGATIVE  NEGATIVE    Urobilinogen, Urine      Normal mg/dL Normal    Nitrite, Urine      NEGATIVE  NEGATIVE    Leukocyte Esterase, Urine      NEGATIVE  75 Tato/µL !    CHOLESTEROL      0 - 199 mg/dL 221 (H)    HDL CHOLESTEROL      mg/dL 80.4    Cholesterol/HDL Ratio 2.7    LDL Calculated      <=99 mg/dL 124 (H)    VLDL      0 - 40 mg/dL 17    TRIGLYCERIDES      0 - 149 mg/dL 83    Non HDL Cholesterol      0 - 149 mg/dL 141    Albumin, Urine Random      Not established mg/L 19.3    Creatinine, Urine Random      20.0 - 320.0 mg/dL 106.0    Albumin/Creatinine Ratio      <30.0 ug/mg Creat 18.2    WBC, Urine      1-5, NONE /HPF 1-5    RBC, Urine      NONE, 1-2, 3-5 /HPF 1-2    Mucus, Urine      Reference range not established. /LPF FEW    Vitamin D, 25-Hydroxy, Total      30 - 100 ng/mL 41    Thyroid Stimulating Hormone      0.44 - 3.98 mIU/L 2.28    Vitamin B12      211 - 911 pg/mL 376       Component      Latest Ref Rng 10/27/2023 10/30/2023   JOCELIN      Negative  Negative     Rheumatoid Factor      0 - 15 IU/mL <10     Sed Rate      0 - 30 mm/h 10     C-Reactive Protein      <1.00 mg/dL 0.75     Citrulline Antibody, IgG      <3 U/mL <1     CORTISOL      2.5 - 20.0 ug/dL 25.2 (H)  1.2 (L)       Assessment and Plan:  Problem List Items Addressed This Visit          High    Routine health maintenance - Primary     Overview         Declined Prevnar 13, Prevnar 20 and Influenza vaccines      Pneumovax 23 8/10/20      Moderna COVID 19 vaccine 2/27/21, 3/27/21       Tdap (Age 7+)12/2/2014      BMD 9/18, 8/20, 10/4/22, 10/16/24      Mamm 9/11/18; 9/17/20; 9/23/21, 10/9/23, 10/31/24      s/p TAHBSO      Cscope 3/24/21 (10yrs)      10/15/21: No evidence for abdominal aortic aneurysm.      9/15/22: Current 10-Year ASCVD Risk: 9.37% - Intermediate Risk  8/20: CT calcium score=0          Current Assessment & Plan     Annual Wellness exam completed   Preventive Health History reviewed  Ordered:   Labs    Mammogram   TDAP at pharmacy  Discussed Shingrix              Medium    Acne    Relevant Medications    clindamycin (Cleocin T) 1 % lotion    doxycycline (Vibramycin) 50 mg capsule    metroNIDAZOLE (Metrocream) 0.75 % cream    Advance directive discussed with patient    Overview     12/3/24  HCPOA: Shelbie Hansen (friend)  Pt wishes to be cremated  DNR Arrest         Alcohol screening    Overview     12/03/24: 5 minutes spent screening for alcohol misuse  See snapshot (social documentation) for details.  Advised no more than 1/day         BMI 28.0-28.9,adult    Overview     No family hx MEN/MTC  No personal hx pancreatitis  Will try IF  Also recommend WW         Current Assessment & Plan     15 minutes spent discussing:  BMI was above normal measurement. Current weight: 76.3 kg (168 lb 4 oz)  Weight change since last visit (-) denotes wt loss -1.75 lbs   Weight loss needed to achieve BMI 25: 21.75 Lbs  Weight loss needed to achieve BMI 30: -7.55 Lbs  Will try semaglutide compounded  I have discussed both the risks and benefits of the treatment plan, including potential adverse effects/benefits of medication as well as not taking medication (and the potential risks of that), and patient understands/agrees with the treatment plan.             Cardiac risk counseling    Overview     12/3/24: ASCVD risk 10.5%  ASA not indicated          Cervical spondylosis without myelopathy    Overview     MRI 2018: cervical spondylosis most significantly affecting c5-c6 and c6-c7. There is      mild flattening of the cord         Relevant Orders    Referral to Physical Therapy    Encounter for screening mammogram for breast cancer    Relevant Orders    BI mammo bilateral screening tomosynthesis    Essential hypertension, benign    Overview     Goal <130/80      2/21/22: hers: 145/93 hr 99              ours: 134/82 hr 94      On ACE/HCTZ and Norvasc      Controlled at home         Relevant Medications    amLODIPine (Norvasc) 10 mg tablet    lisinopriL-hydrochlorothiazide 20-12.5 mg tablet    Other Relevant Orders    TSH with reflex to Free T4 if abnormal    Comprehensive Metabolic Panel    CBC and Auto Differential    Lipid Panel    Urinalysis with Reflex Microscopic    Albumin-Creatinine Ratio, Urine Random    Basic metabolic panel    Myalgia, multiple sites    Overview     Sx suggestive of CTD/AI cause, labs (-) in 2023  Cannot use NSAIDs due to hx colitis  Continue Tylenol         Current Assessment & Plan     B12 injection, see if that makes any difference  Trial short course steroid to see if any improvement, ? PMR  See if GLP1 med makes any difference  Rheum  consult         Relevant Medications    cyanocobalamin (Vitamin B-12) injection 1,000 mcg (Start on 12/3/2024  9:00 AM)    predniSONE (Deltasone) 20 mg tablet    Other Relevant Orders    C-reactive protein    Sedimentation Rate    Referral to Rheumatology    PTH, intact    Screening for multiple conditions    Overview     Depression screening completed using the PHQ-2 questions with results documented in the chart/encounter  (See Rooming Screenings for documentation)           Thyroid nodule    Overview     US 9/21: Stable multinodular thyroid gland dating back to April 2019. Ultrasound      confirms a 16mm nodule when previously measured 18 mm with other subcentimeter      nonsuspicious appearing  "nodules present.       Per ENT consult 10/21:Rcommendation for the patient is observation and repeat in      1 year.       2018:thyroid echo impression: 1.8cm right thyroid nodule, suspicious. FNA bx under US      guidance is rec'd for further eval      FNA done in 2019      Saw ENT      US 8/2020: Nonspecific diffuse heterogeneity of the thyroid parenchyma  US 2021: Stable multinodular thyroid gland dating back to April 2019.   Per ENT in 2021: Patient with known history of thyroid nodules here for review of most recent ultrasound. Ultrasound confirms a 16mm nodule when previously measured 18 mm with other subcentimeter nonsuspicious appearing nodules present. At this time recommendation for the patient is observation and repeat in 1 year.          Current Assessment & Plan     Reepat US         Relevant Orders    TSH with reflex to Free T4 if abnormal    US thyroid    Vitamin D insufficiency    Overview     Goal 40-50      On supplement         Relevant Medications    cholecalciferol (Vitamin D3) 50 mcg (2,000 unit) capsule    Other Relevant Orders    Vitamin D 25-Hydroxy,Total (for eval of Vitamin D levels)     Other Visit Diagnoses       Need for Tdap vaccination        Relevant Medications    diphth,pertus,acell,,tetanus (BoostRIX) 2.5-8-5 Lf-mcg-Lf/0.5mL injection    Screening for cardiovascular condition        Relevant Orders    CT cardiac scoring wo IV contrast              ROS otherwise negative aside from what was mentioned above in HPI.    Vitals  /76   Pulse 79   Temp 36.5 °C (97.7 °F)   Ht 1.632 m (5' 4.25\")   Wt 76.3 kg (168 lb 4 oz)   SpO2 99%   BMI 28.66 kg/m²   Body mass index is 28.66 kg/m².  Physical Exam  Gen: Alert, NAD  HEENT:  Unremarkable  Neck:  No MARY  Respiratory:  Lungs CTAB  Cardiovascular:  Heart RRR  Neuro:  Gross motor and sensory intact  Skin:  No suspicious lesions present  Breast: No masses, or axillary lymphadenopathy, bilateral inverted nipples (chronic)      Patient " Care Team:  Leisa Scales MD as PCP - General  Leisa Scales MD as PCP - [facility]  Devang Jaime MD as Consulting Physician (Sports Medicine)  Lesley Pardo MD as Consulting Physician (Pulmonary Disease)       Health Risk Assessment:  Patient was asked if he/she has any difficulty performing the following activities of daily living:  Preparing nutritious food and eating? No  Grocery shopping? No  Bathing and grooming yourself? No  Getting dressed? No  Using the toilet?No  Using the phone? No  Moving around from place to place (physical ambulation)? No  Doing housework (including laundry) independently? No  Managing finances independently? No  Managing medications independently? No  Doing housework (including laundry) independently? No    Patient was asked if he/she:  Feels safe in current home environment?: Yes  Uses seatbelt? Yes  Sees the dentist regularly? Yes  Exercises regularly: Yes  Suffers from depression, stress, anger, loneliness or social isolation, pain, suicidality? No    Dietary issues discussed: Yes  Cognitive Impairment No  Hearing difficulties: No  Visual Acuity assessed: No    What is your self-assessment of overall health status and life satisfaction? Good     5 minutes spent on SDOH screening:   Specifically Housing, Food Insecurity, Utilities and Transportatin Needs were evaluated   (See Screenings in Rooming section for documentation)  Food Insecurity: No Food Insecurity (12/3/2024)    Hunger Vital Sign     Worried About Running Out of Food in the Last Year: Never true     Ran Out of Food in the Last Year: Never true     Housing Stability: Unknown (12/3/2024)    Housing Stability Vital Sign     Unable to Pay for Housing in the Last Year: No     Number of Times Moved in the Last Year: Not on file     Homeless in the Last Year: No     Transportation Needs: No Transportation Needs (12/3/2024)    PRAPARE - Transportation     Lack of Transportation (Medical): No      Lack of Transportation (Non-Medical): No         Allergies and Medications  Amoxicillin and Meperidine  Current Outpatient Medications   Medication Instructions    amLODIPine (NORVASC) 10 mg, oral, Daily    cholecalciferol (VITAMIN D3) 50 mcg, oral, Daily    clindamycin (Cleocin T) 1 % lotion 1 Application, Topical, 2 times daily, to face    diphth,pertus,acell,,tetanus (BoostRIX) 2.5-8-5 Lf-mcg-Lf/0.5mL injection 0.5 mL, intramuscular, Once    doxycycline (VIBRAMYCIN) 50 mg, oral, Daily (0630)    lisinopriL-hydrochlorothiazide 20-12.5 mg tablet 2 tablets, oral, Daily    metroNIDAZOLE (Metrocream) 0.75 % cream 1 Application, Topical, 2 times daily    predniSONE (DELTASONE) 40 mg, oral, Daily    saccharomyces boulardii (FLORASTOR) 250 mg, Daily       Medications and Supplements  prescribed by me and other practitioners or clinical pharmacist (such as prescriptions, OTC's, herbal therapies and supplements) were reviewed and documented in the medical record.      Active Problem List  Patient Active Problem List   Diagnosis    Cervical spondylosis without myelopathy    Diastolic dysfunction, left ventricle    Disorder of bone density and structure, unspecified    History of endometriosis    Essential hypertension, benign    Familial hypocalciuric hypercalcemia syndrome    Lung nodule    Thyroid nodule    Vitamin D insufficiency    BMI 28.0-28.9,adult    Myalgia, multiple sites    Advance directive discussed with patient    Cardiac risk counseling    History of colitis    Routine health maintenance    Menopause    Encounter for screening mammogram for breast cancer    Screening for multiple conditions    Alcohol screening    Encounter for screening involving social determinants of health (SDoH)    Acne       Comprehensive Medical/Surgical/Social/Family History  Past Medical History:   Diagnosis Date    Abnormal PET scan, lung 10/2021    10/21: 1. Mild metabolic activity within the left lower lobe pulmonary nodule which      demonstrates stable to decreased activity when compared to the prior exam. This favors     a non malignant etiology, possibly due to a resolving infectious/inflammatory process.     Recommend continued follow-up imaging to assess for stability/resolution.     2. There is a hypermetabolic focus within the m    Abnormal ultrasound of thyroid gland 09/2021 9/21: Stable multinodular thyroid gland :      8/20: Nonspecific diffuse heterogeneity of the thyroid parenchyma and multiple nodules     within both lobes, not significantly changed from prior. No new dominant thyroid     nodule.     Right thyroid lobe there is a heterogeneously hypoechoic predominantly solid ovoid     nodule measuring 1.8 x 0.9 x 1.5 cm, not significantly changed in size    H/O bone density study     09/18- Tscore -1.9 FRAX 8.7/1.1 OSTEOPENIA     8/20: Ten Year Major Osteoporotic Fracture Risk: 9.09%      Ten Year Hip Fracture Risk: 1.02%      T-Score: -1.5    H/O bone density study     10/22: Lowest T score -1.4. FRAX: Major Osteoporotic Fracture 9.4%   Hip Fracture                1.2%    H/O bone density study 10/16/2024    Lowest T score -1.7. 10-year Fracture Risk: Major Osteoporotic Fracture  10.1% Hip Fracture                        1.5 %    H/O chest x-ray     9/19/18 NORMAL    H/O CT scan     8/20: CT calcium score=0     11 x 10 mm nodule in the left lower lobe. Further workup     with dedicated CT thorax is recommended    H/O CT scan of chest     9/20: 1. 1.96 cm oval lesion in the left lower lobe. Neoplasm cannot be     excluded. Further evaluation with PET-CT is suggested. The referring     physician was notified via critical results.     2. Emphysematous changes.     8/22: 8 mm noncalcified pulmonary nodule left lung base stable. 5 mm nodule     left lung base stable. 6 mm nodule right lung base stable. Benign     calcified granuloma l    H/O Doppler ultrasound     10/15/21: US aorta: No evidence for abdominal aortic aneurysm.     H/O echocardiogram 2022: 1. The left ventricular systolic function is normal with a 55-60% estimated ejection     fraction.     2. Spectral Doppler shows an impaired relaxation pattern of left ventricular diastolic     filling.     3. There is no evidence of left ventricular hypertrophy     4. Trace MR/TR    H/O x-ray of hip 2023    Bilateral hips - 1. No acute osseous findings. 2. Mild insertional enthesopathy at the level of right greater trochanter. Recommend correlation with patient's symptomatology associated with this. An MRI can be obtained for further evaluation if clinically warranted.    History of MRI of cervical spine     2018: impression : cervical spondylosis most significantly affecting c5-c6 and c6-c7.     There is mild flattening of the cord    History of MRI of chest 2018: C6-7 upper limits of normal to mildly enlarged. hemangiomas, nodul in R lobe of thyroid gland c/ scattered cervical lymph nodes measuring 12mm. questionable     enlargement vs upper limits of normal size exiting nerve roots C7-T1 &     C6-7: possibility polyneuropathy or less likely nerve sheath tumors. degenerative     changes cervical spine (rec MRI C spine). intermediate nodule 1 cm in     Past Surgical History:   Procedure Laterality Date    COLONOSCOPY  2021    normal    HERNIA REPAIR  09/15/2018    Hernia Repair    HYSTERECTOMY      one ovary remains    INCISIONAL BREAST BIOPSY Right 09/15/2018    Incisional Breast Biopsy - benign     Social History     Social History Narrative    Family History:        M: HTN, Breast CA age 65, Hypercalcemia, arthritis ( age 90)        F: Lymphoma ( age 82)        B: Prostate CA ( age 76)        B: HTN        S: HTN        MGM: HTN, Breast CA        Social History:    Single, no kids    Retired from [employer] in [place], hasnt worked since COVID    Works Part time at [employer]    Nonsmoker    Social ETOH: 8-10 glasses wine/week          Tobacco/Alcohol/Opioid use, as well as Illicit Drug Use was screened for/reviewed and documented in Social Documentation section of the chart and medication list as appropriate     Depression Screening  Depression screening completed using the PHQ-2 questions, with results documented in the chart/encounter (5 min spent on this).  (See Rooming Screening section for documentation, and/or progress note for additional information)    Cardiac Risk Assessment (15 minutes spent on this)  The 10-year ASCVD risk score (Marco Antonio BARAHONA, et al., 2019) is: 11.6%    Values used to calculate the score:      Age: 69 years      Sex: Female      Is Non- : No      Diabetic: No      Tobacco smoker: No      Systolic Blood Pressure: 118 mmHg      Is BP treated: Yes      HDL Cholesterol: 80.4 mg/dL      Total Cholesterol: 221 mg/dL    Cardiovascular risk was discussed and, if needed, lifestyle modifications recommended, including nutritional choices, exercise, and elimination of habits contributing to risk. We agreed on a plan to reduce the current cardiovascular risk based on above discussion as needed.     Aspirin use/disuse was discussed and documented in the Problem List of the medical record (under Cardiac Risk Counseling) after reviewing the updated guidelines below:  Consider low dose Aspirin ( mg) use if the benefit for cardiovascular disease prevention outweighs risk for bleeding complications.   Discussed that in general, low dose ASA should be considered:  In patients WITHOUT prior MI/stroke/PAD (primary prevention):   a. Age <60: Use if 10-year cardiovascular disease risk >20%, with discussion of risks and benefits with patient  b. Age 60-<70: Use if 10-year cardiovascular disease risk >20% and low bleeding (e.g., gastrointestinal) risk, with discussion of risks and benefits with patient  c. Age >=70: Do not use    In patients WITH prior MI/stroke/PAD (secondary prevention):   Generally use  unless extremely high bleeding (e.g., gastrointenstinal) risk, with discussion of risks and benefits with patient    Advance Directives Discussion  Advanced Care Planning (including a Living Will, Healthcare POA, as well as specific end of life choices and/or directives), was discussed with the patient and/or surrogate, voluntarily, and details of that discussion documented in the Problem List (under Advanced Directives Discussion) of the medical record.   (16 min spent discussing above)     During the course of the visit the patient was educated and counseled about age appropriate screening and preventive services.   Completed preventive screenings were documented in the chart (see Routine Health Maintenance in Problem List) and orders were placed for outstanding screenings/procedures as documented in the Assessment and Plan.    Patient Instructions (the written plan) was given to the patient at check out as part of the AVS.

## 2024-12-03 NOTE — ASSESSMENT & PLAN NOTE
B12 injection, see if that makes any difference  Trial short course steroid to see if any improvement, ? PMR  See if GLP1 med makes any difference  Rheum  consult

## 2024-12-10 ENCOUNTER — LAB (OUTPATIENT)
Dept: LAB | Facility: LAB | Age: 69
End: 2024-12-10
Payer: MEDICARE

## 2024-12-10 ENCOUNTER — HOSPITAL ENCOUNTER (OUTPATIENT)
Dept: RADIOLOGY | Facility: CLINIC | Age: 69
Discharge: HOME | End: 2024-12-10
Payer: MEDICARE

## 2024-12-10 DIAGNOSIS — E04.1 THYROID NODULE: ICD-10-CM

## 2024-12-10 DIAGNOSIS — M79.18 MYALGIA, MULTIPLE SITES: ICD-10-CM

## 2024-12-10 LAB
CRP SERPL-MCNC: 0.21 MG/DL
ERYTHROCYTE [SEDIMENTATION RATE] IN BLOOD BY WESTERGREN METHOD: 7 MM/H (ref 0–30)
PTH-INTACT SERPL-MCNC: 61.8 PG/ML (ref 18.5–88)

## 2024-12-10 PROCEDURE — 36415 COLL VENOUS BLD VENIPUNCTURE: CPT

## 2024-12-10 PROCEDURE — 83970 ASSAY OF PARATHORMONE: CPT

## 2024-12-10 PROCEDURE — 76536 US EXAM OF HEAD AND NECK: CPT | Performed by: RADIOLOGY

## 2024-12-10 PROCEDURE — 86140 C-REACTIVE PROTEIN: CPT

## 2024-12-10 PROCEDURE — 76536 US EXAM OF HEAD AND NECK: CPT

## 2024-12-10 PROCEDURE — 85652 RBC SED RATE AUTOMATED: CPT

## 2025-02-18 ENCOUNTER — TELEPHONE (OUTPATIENT)
Dept: PRIMARY CARE | Facility: CLINIC | Age: 70
End: 2025-02-18
Payer: MEDICARE

## 2025-02-18 PROBLEM — N20.0 KIDNEY STONES: Status: ACTIVE | Noted: 2025-02-18

## 2025-02-18 NOTE — TELEPHONE ENCOUNTER
CT showed a kidney stone on the right  May have had one on the left that passed  WBC was elevated- suggests infection or inflammation  So could have been a gastroenteritis of some sort  Would recommend hospital follow up, NP is fine  If she wants to stop the med that's fine

## 2025-02-18 NOTE — TELEPHONE ENCOUNTER
Spoke with pt relayed message about [facility] results pt states she is going to wait on scheduling an appt and see how the next few days go will call for appt if needed

## 2025-02-18 NOTE — TELEPHONE ENCOUNTER
Pt states she went to [facility] ER yesterday for abd pain asking if you can look at notes and tests results states they said something about WBC they did labs and a ct  She states she is taking a GLP1 med does not think it is agree with her  She is feeling better today

## 2025-02-19 NOTE — TELEPHONE ENCOUNTER
Patient calling in, Has a Kidney stone and said she is experiencing severe nausea and pain. Wants to do a VV.    Please schedule.   Detail Level: Detailed Depth Of Biopsy: dermis Was A Bandage Applied: Yes Size Of Lesion In Cm: 1 X Size Of Lesion In Cm: 0.9 Biopsy Type: H and E Biopsy Method: Dermablade Anesthesia Type: 1% lidocaine with epinephrine Anesthesia Volume In Cc (Will Not Render If 0): 0.5 Additional Anesthesia Volume In Cc (Will Not Render If 0): 0 Hemostasis: Drysol Wound Care: Petrolatum Dressing: bandage Destruction After The Procedure: No Type Of Destruction Used: Curettage Curettage Text: The wound bed was treated with curettage after the biopsy was performed. Cryotherapy Text: The wound bed was treated with cryotherapy after the biopsy was performed. Electrodesiccation Text: The wound bed was treated with electrodesiccation after the biopsy was performed. Electrodesiccation And Curettage Text: The wound bed was treated with electrodesiccation and curettage after the biopsy was performed. Silver Nitrate Text: The wound bed was treated with silver nitrate after the biopsy was performed. Lab: 6 Lab Facility: 3 Consent: Written consent was obtained and risks were reviewed including but not limited to scarring, infection, bleeding, scabbing, incomplete removal, nerve damage and allergy to anesthesia. Post-Care Instructions: I reviewed with the patient in detail post-care instructions. Patient is to keep the biopsy site dry overnight, and then apply bacitracin twice daily until healed. Patient may apply hydrogen peroxide soaks to remove any crusting. Notification Instructions: Patient will be notified of biopsy results. However, patient instructed to call the office if not contacted within 2 weeks. Billing Type: Third-Party Bill Information: Selecting Yes will display possible errors in your note based on the variables you have selected. This validation is only offered as a suggestion for you. PLEASE NOTE THAT THE VALIDATION TEXT WILL BE REMOVED WHEN YOU FINALIZE YOUR NOTE. IF YOU WANT TO FAX A PRELIMINARY NOTE YOU WILL NEED TO TOGGLE THIS TO 'NO' IF YOU DO NOT WANT IT IN YOUR FAXED NOTE.

## 2025-02-20 ENCOUNTER — HOSPITAL ENCOUNTER (OUTPATIENT)
Dept: RADIOLOGY | Facility: CLINIC | Age: 70
Discharge: HOME | End: 2025-02-20
Payer: MEDICARE

## 2025-02-20 ENCOUNTER — OFFICE VISIT (OUTPATIENT)
Dept: PRIMARY CARE | Facility: CLINIC | Age: 70
End: 2025-02-20
Payer: MEDICARE

## 2025-02-20 VITALS
OXYGEN SATURATION: 98 % | SYSTOLIC BLOOD PRESSURE: 134 MMHG | BODY MASS INDEX: 25.61 KG/M2 | WEIGHT: 150 LBS | TEMPERATURE: 98.1 F | HEIGHT: 64 IN | DIASTOLIC BLOOD PRESSURE: 87 MMHG | HEART RATE: 103 BPM

## 2025-02-20 DIAGNOSIS — R11.2 NAUSEA AND VOMITING, UNSPECIFIED VOMITING TYPE: ICD-10-CM

## 2025-02-20 DIAGNOSIS — N28.9 FUNCTION KIDNEY DECREASED: ICD-10-CM

## 2025-02-20 DIAGNOSIS — N13.2 HYDRONEPHROSIS WITH URINARY OBSTRUCTION DUE TO URETERAL CALCULUS: ICD-10-CM

## 2025-02-20 DIAGNOSIS — R10.9 RIGHT FLANK PAIN: ICD-10-CM

## 2025-02-20 DIAGNOSIS — N13.2 HYDRONEPHROSIS WITH URINARY OBSTRUCTION DUE TO URETERAL CALCULUS: Primary | ICD-10-CM

## 2025-02-20 DIAGNOSIS — R79.89 ABNORMAL CBC: ICD-10-CM

## 2025-02-20 DIAGNOSIS — K86.89 PANCREATIC MASS (HHS-HCC): ICD-10-CM

## 2025-02-20 PROCEDURE — 3075F SYST BP GE 130 - 139MM HG: CPT | Performed by: NURSE PRACTITIONER

## 2025-02-20 PROCEDURE — 1159F MED LIST DOCD IN RCRD: CPT | Performed by: NURSE PRACTITIONER

## 2025-02-20 PROCEDURE — 1160F RVW MEDS BY RX/DR IN RCRD: CPT | Performed by: NURSE PRACTITIONER

## 2025-02-20 PROCEDURE — 3079F DIAST BP 80-89 MM HG: CPT | Performed by: NURSE PRACTITIONER

## 2025-02-20 PROCEDURE — 1036F TOBACCO NON-USER: CPT | Performed by: NURSE PRACTITIONER

## 2025-02-20 PROCEDURE — 74177 CT ABD & PELVIS W/CONTRAST: CPT

## 2025-02-20 PROCEDURE — 1123F ACP DISCUSS/DSCN MKR DOCD: CPT | Performed by: NURSE PRACTITIONER

## 2025-02-20 PROCEDURE — 2550000001 HC RX 255 CONTRASTS: Performed by: NURSE PRACTITIONER

## 2025-02-20 PROCEDURE — 99214 OFFICE O/P EST MOD 30 MIN: CPT | Performed by: NURSE PRACTITIONER

## 2025-02-20 PROCEDURE — 3008F BODY MASS INDEX DOCD: CPT | Performed by: NURSE PRACTITIONER

## 2025-02-20 PROCEDURE — G2211 COMPLEX E/M VISIT ADD ON: HCPCS | Performed by: NURSE PRACTITIONER

## 2025-02-20 RX ORDER — CIPROFLOXACIN 500 MG/1
500 TABLET ORAL 2 TIMES DAILY
Qty: 14 TABLET | Refills: 0 | Status: SHIPPED | OUTPATIENT
Start: 2025-02-20 | End: 2025-02-27

## 2025-02-20 RX ORDER — TRAMADOL HYDROCHLORIDE 50 MG/1
50 TABLET ORAL EVERY 4 HOURS PRN
Qty: 42 TABLET | Refills: 0 | Status: SHIPPED | OUTPATIENT
Start: 2025-02-20 | End: 2025-02-27

## 2025-02-20 RX ORDER — TAMSULOSIN HYDROCHLORIDE 0.4 MG/1
CAPSULE ORAL
Qty: 90 CAPSULE | OUTPATIENT
Start: 2025-02-20

## 2025-02-20 RX ORDER — TAMSULOSIN HYDROCHLORIDE 0.4 MG/1
0.4 CAPSULE ORAL DAILY
Qty: 30 CAPSULE | Refills: 1 | Status: SHIPPED | OUTPATIENT
Start: 2025-02-20 | End: 2026-02-20

## 2025-02-20 RX ADMIN — IOHEXOL 75 ML: 350 INJECTION, SOLUTION INTRAVENOUS at 12:44

## 2025-02-20 NOTE — PROGRESS NOTES
Problem List Items Addressed This Visit          Medium    Hydronephrosis with urinary obstruction due to ureteral calculus - Primary    Overview     2/17/25 CT:  Partially obstructing 6 mm diameter calculus in the proximal RIGHT ureter   resulting in moderate RIGHT-sided hydronephrosis and delayed enhancement   of the RIGHT kidney.          Current Assessment & Plan     Repeat CT today  May need return to ED  Flomax & tramadol sent         Relevant Medications    tamsulosin (Flomax) 0.4 mg 24 hr capsule    traMADol (Ultram) 50 mg tablet    Other Relevant Orders    CT abdomen pelvis w IV contrast    CBC and Auto Differential    Comprehensive Metabolic Panel    Magnesium     Other Visit Diagnoses       Nausea and vomiting, unspecified vomiting type        Relevant Medications    traMADol (Ultram) 50 mg tablet    Other Relevant Orders    CT abdomen pelvis w IV contrast    CBC and Auto Differential    Comprehensive Metabolic Panel    Magnesium    Right flank pain        r/t known partially obstructing 6 mm stone    Relevant Medications    traMADol (Ultram) 50 mg tablet    Other Relevant Orders    CT abdomen pelvis w IV contrast    CBC and Auto Differential    Comprehensive Metabolic Panel    Magnesium    Abnormal CBC        WBC = 13.92  labs for trending    Relevant Medications    traMADol (Ultram) 50 mg tablet    Other Relevant Orders    CT abdomen pelvis w IV contrast    CBC and Auto Differential    Comprehensive Metabolic Panel    Magnesium    Function kidney decreased        GFR = 68  recheck for trending    Relevant Medications    traMADol (Ultram) 50 mg tablet    Other Relevant Orders    CT abdomen pelvis w IV contrast    CBC and Auto Differential    Comprehensive Metabolic Panel    Magnesium             Subjective   Patient ID: Ambreen Garcia is a 69 y.o. female who presents for kidney stone (Vomiting everyday /In a lot of pain from kidney stone /Can't tolerate fluids/ food /No fever but has chills /Still voiding  - less frequent /No BM - 4-5 days ).  HPI  Went to [facility] 2/17  Did labs & CT  - was told hospital full and she left  [facility] notified her of lab results  Hasn't been notified of CT results    Vomiting with every fluid intake  Today maybe 4 oz of water has stayed down  Yesterday vomited bowel of cereal  Is passing gas  Pain R flank   Low transverse  Constant x hours then can go away    2/17/25 ED:  ED INTAKE NOTE  Patient Name: Ambreen Garcia   MRN: [id number]   Service Date: 2/17/25    BRIEF HPI:  This is a 69 year old female who presents to the ED with: Lower abdominal pain rating to the back right side greater than left for the past 3 days. She has had nausea and vomiting. She is taking weight loss injections. No UTI symptoms.    BRIEF EXAM:  Awake and Alert. No apparent distress.    INITIAL WORKUP AND DECISION MAKING:    Orders Placed This Encounter   No orders of the defined types were placed in this encounter.   Labs  CT    Provider examination performed via virtual platform with assistance from bedside clinician.    Further triage/room assignment per nursing    Limited role in this case performing a screening of the patient in triage    Further work up, treatment, follow up on testing ordered from triage, further testing, care, disposition, and final MDM per downstream emergency provider team    Please see downstream ED providers's notation for full HnP and MDM    SIGNATURE: Monty Mariano MD     2/17/25 CT:  Partially obstructing 6 mm diameter calculus in the proximal RIGHT ureter   resulting in moderate RIGHT-sided hydronephrosis and delayed enhancement   of the RIGHT kidney.   Protein, Total  6.3 - 8.0 g/dL 8   Albumin  3.9 - 4.9 g/dL 4.7   Calcium, Total  8.5 - 10.2 mg/dL 10.9 High    Bilirubin, Total  0.2 - 1.3 mg/dL 0.3   Alkaline Phosphatase  34 - 123 U/L 67   AST  13 - 35 U/L 17   ALT  7 - 38 U/L 16   Glucose  74 - 99 mg/dL 148 High    Comment: The American Diabetes Association (ADA) provides guidance for  cutoff values for fasting glucose and random glucose. The ADA defines fasting as no caloric intake for at least 8 hours. Fasting plasma glucose results between 100 to 125 mg/dL indicate increased risk for diabetes (prediabetes).  Fasting plasma glucose results greater than or equal to 126 mg/dL meet the criteria for diagnosis of diabetes. In the absence of unequivocal hyperglycemia, results should be confirmed by repeat testing. In a patient with classic symptoms of hyperglycemia or hyperglycemic crisis, random plasma glucose results greater than or equal to 200 mg/dL meet the criteria for diagnosis of diabetes.  Reference: Standards of Medical Care in Diabetes 2016, American Diabetes Association. Diabetes Care. 2016.39(Suppl 1).   BUN  7 - 21 mg/dL 23 High    Creatinine  0.58 - 0.96 mg/dL 0.91   Sodium  136 - 144 mmol/L 136   Potassium  3.7 - 5.1 mmol/L 3.5 Low    Chloride  98 - 107 mmol/L 96 Low    CO2  22 - 30 mmol/L 24   Anion Gap  8 - 15 mmol/L 16 High    Estimated Glomerular Filtration Rate  >=60 mL/min/1.73m² 68     Magnesium  1.7 - 2.3 mg/dL 2.3     WBC  3.70 - 11.00 k/uL 13.92 High    RBC  3.90 - 5.20 m/uL 4.56   Hemoglobin  11.5 - 15.5 g/dL 14.1   Hematocrit  36.0 - 46.0 % 39.6   MCV  80.0 - 100.0 fL 86.8   MCH  26.0 - 34.0 pg 30.9   MCHC  30.5 - 36.0 g/dL 35.6   RDW-CV  11.5 - 15.0 % 12.2   Platelet Count  150 - 400 k/uL 351   MPV  9.0 - 12.7 fL 9.5   Neutrophils %  % 85.2   Abs Neut  1.45 - 7.50 k/uL 11.86 High    Lymphocytes %  % 9.9   Abs Lymph  1.00 - 4.00 k/uL 1.38   Monocytes %  % 4.3   Abs Mono  <0.87 k/uL 0.6   Eosinophils %  % 0   Abs Eosin  <0.46 k/uL <0.03   Basophils %  % 0.2   Abs Baso  <0.11 k/uL 0.03   Immature Granulocytes %  % 0.4   Abs Immature Gran  <0.10 k/uL 0.05   NRBC  /100 WBC 0   Absolute nRBC  <0.01 k/uL <0.01   Diff Type Auto       Review of Systems    BP Readings from Last 3 Encounters:   02/20/25 134/87   12/03/24 118/76   10/26/23 112/74      Wt Readings from Last 3  "Encounters:   02/20/25 68 kg (150 lb)   12/03/24 76.3 kg (168 lb 4 oz)   10/30/24 77.1 kg (170 lb)      BMI:   Estimated body mass index is 25.55 kg/m² as calculated from the following:    Height as of this encounter: 1.632 m (5' 4.25\").    Weight as of this encounter: 68 kg (150 lb).    Objective   Physical Exam  Constitutional:       General: She is not in acute distress.  HENT:      Head: Normocephalic and atraumatic.      Right Ear: Tympanic membrane and external ear normal.      Left Ear: Tympanic membrane and external ear normal.      Nose: Nose normal.      Mouth/Throat:      Mouth: Mucous membranes are moist.   Eyes:      Extraocular Movements: Extraocular movements intact.      Conjunctiva/sclera: Conjunctivae normal.   Neck:      Vascular: No carotid bruit.   Cardiovascular:      Rate and Rhythm: Normal rate and regular rhythm.      Pulses: Normal pulses.   Pulmonary:      Effort: Pulmonary effort is normal.      Breath sounds: Normal breath sounds.   Abdominal:      General: Bowel sounds are normal. There is distension.      Palpations: Abdomen is soft. There is no mass.      Tenderness: There is no abdominal tenderness. There is right CVA tenderness. There is no left CVA tenderness, guarding or rebound.      Comments: Painful RLQ extending to low midline    Musculoskeletal:         General: Normal range of motion.      Cervical back: Normal range of motion and neck supple.   Lymphadenopathy:      Cervical: No cervical adenopathy.   Skin:     General: Skin is warm and dry.   Neurological:      General: No focal deficit present.      Mental Status: She is alert.   Psychiatric:         Mood and Affect: Mood normal.       "

## 2025-02-21 PROBLEM — K86.89 PANCREATIC MASS (HHS-HCC): Status: ACTIVE | Noted: 2025-02-21

## 2025-02-25 ENCOUNTER — TELEPHONE (OUTPATIENT)
Dept: RADIOLOGY | Facility: HOSPITAL | Age: 70
End: 2025-02-25
Payer: MEDICARE

## 2025-02-25 NOTE — TELEPHONE ENCOUNTER
"Ambreen is listed on the incidental findings list based on imaging completed 2/20/2025. Ordering provider, María Heck NP, wrote under result notes, \"I ordered MRCP for 3 months. I've also ordered fu with GI pancreas.\" Will check back to see if these are scheduled.   "

## 2025-02-28 ENCOUNTER — TELEPHONE (OUTPATIENT)
Dept: RADIOLOGY | Facility: HOSPITAL | Age: 70
End: 2025-02-28
Payer: MEDICARE

## 2025-02-28 NOTE — TELEPHONE ENCOUNTER
Call to Ambreen to assist with scheduling follow up imaging that radiology recommended that was ordered by María FERMIN. Left voice message requesting a call back.

## 2025-03-25 ENCOUNTER — APPOINTMENT (OUTPATIENT)
Dept: RHEUMATOLOGY | Facility: CLINIC | Age: 70
End: 2025-03-25
Payer: MEDICARE

## 2025-05-21 ENCOUNTER — HOSPITAL ENCOUNTER (OUTPATIENT)
Dept: RADIOLOGY | Facility: CLINIC | Age: 70
Discharge: HOME | End: 2025-05-21
Payer: MEDICARE

## 2025-05-21 DIAGNOSIS — K86.89 PANCREATIC MASS (HHS-HCC): ICD-10-CM

## 2025-05-21 PROCEDURE — A9575 INJ GADOTERATE MEGLUMI 0.1ML: HCPCS | Mod: JZ | Performed by: NURSE PRACTITIONER

## 2025-05-21 PROCEDURE — 74183 MRI ABD W/O CNTR FLWD CNTR: CPT

## 2025-05-21 PROCEDURE — 2550000001 HC RX 255 CONTRASTS: Mod: JZ | Performed by: NURSE PRACTITIONER

## 2025-05-21 RX ORDER — GADOTERATE MEGLUMINE 376.9 MG/ML
15 INJECTION INTRAVENOUS
Status: COMPLETED | OUTPATIENT
Start: 2025-05-21 | End: 2025-05-21

## 2025-05-21 RX ADMIN — GADOTERATE MEGLUMINE 15 ML: 376.9 INJECTION INTRAVENOUS at 11:09

## 2025-06-02 ENCOUNTER — TELEPHONE (OUTPATIENT)
Dept: HEMATOLOGY/ONCOLOGY | Facility: CLINIC | Age: 70
End: 2025-06-02
Payer: MEDICARE

## 2025-06-02 PROBLEM — K44.9 HIATAL HERNIA: Status: ACTIVE | Noted: 2025-06-02

## 2025-06-02 PROBLEM — D73.4 SPLENIC CYST: Status: ACTIVE | Noted: 2025-06-02

## 2025-06-02 PROBLEM — N28.1 RENAL CYST: Status: ACTIVE | Noted: 2025-06-02

## 2025-06-02 PROBLEM — K76.89 HEPATIC CYST: Status: ACTIVE | Noted: 2025-06-02

## 2025-06-02 NOTE — TELEPHONE ENCOUNTER
Called patient to schedule new patient visit with Ciera White. Patient was unable to reach their phone. Left a voicemail with my information.      Ronit Clarke MA

## 2025-06-10 ENCOUNTER — TELEMEDICINE (OUTPATIENT)
Dept: SURGICAL ONCOLOGY | Facility: CLINIC | Age: 70
End: 2025-06-10
Payer: MEDICARE

## 2025-06-10 DIAGNOSIS — D49.0 IPMN (INTRADUCTAL PAPILLARY MUCINOUS NEOPLASM): Primary | ICD-10-CM

## 2025-06-10 PROCEDURE — 99204 OFFICE O/P NEW MOD 45 MIN: CPT | Performed by: PHYSICIAN ASSISTANT

## 2025-06-10 NOTE — PROGRESS NOTES
A virtual visit (audio and visual connection) between the patient (at the originating site) and the provider (at the distant site) was utilized to provide this telehealth service.    Verbal consent was requested and obtained from the patient immediately prior to the telehealth visit.     Subjective   Ms. Garcia is a 69-year-old female who is referred by ZANDER Pierre for pancreatic cysts.     She had a CT A/P with contrast on 2/20/25 for follow-up of an obstructing kidney stone. There was an incidental finding of an enlarging fatty pancreatic mass (seen on prior imaging) as well as a 2.2 cm cyst in the pancreatic neck.     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 She was referred here to further discussion.     She denies a personal history of acute pancreatitis. She denies chronic abdominal pain, early satiety, poor appetite, jaundice or unintentional weight loss. She is not diabetic.     Medical and surgical history: HTN, OA, nephrolithiasis, s/p hernia repair, s/p hysterectomy with oophorectomy (for endometriosis, still has one ovary).   Family history: No pancreatic cancer.   Social history: Non-smoker. Occasional alcohol use. No illicits.     Objective     There were no vitals taken for this visit.     Physical Exam  A physical exam was not conducted as this was a phone or virtual visit. The patient is in no acute distress.     Assessment/Plan   Ms. Garcia is a 69-year-old female who is referred by ZANDER Pierre for pancreatic cysts.     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 She also has a cluster of cysts in the neck of the pancreas, radiographically consistent with branch duct IPMNs.    I discussed that branch duct IPMNs represent potentially pre-malignant lesions and are  managed based on the presence or absence of high risk stigmata or concerning features including cyst size, cyst growth over time, enhancing mural nodule or main duct dilatation. Management decisions are based on these features, as well as, personal medical history, family history, presence or absence of symptoms and patient preference.    I see no high risk stigmata or worrisome features. I recommend annual surveillance with MRI/MRCP. She is agreeable. I will notify her when she is next due.         Ciera White PA-C

## 2025-11-04 ENCOUNTER — APPOINTMENT (OUTPATIENT)
Dept: RADIOLOGY | Facility: CLINIC | Age: 70
End: 2025-11-04
Payer: MEDICARE

## 2026-01-06 ENCOUNTER — APPOINTMENT (OUTPATIENT)
Dept: PRIMARY CARE | Facility: CLINIC | Age: 71
End: 2026-01-06
Payer: MEDICARE